# Patient Record
Sex: FEMALE | Employment: FULL TIME | URBAN - METROPOLITAN AREA
[De-identification: names, ages, dates, MRNs, and addresses within clinical notes are randomized per-mention and may not be internally consistent; named-entity substitution may affect disease eponyms.]

---

## 2018-12-20 ENCOUNTER — HOSPITAL ENCOUNTER (OUTPATIENT)
Age: 33
Setting detail: SPECIMEN
Discharge: HOME | End: 2018-12-20
Payer: COMMERCIAL

## 2018-12-20 LAB
DIRECT EXAM: NORMAL
Lab: NORMAL
SPECIMEN DESCRIPTION: NORMAL
STATUS: NORMAL

## 2018-12-24 LAB
CYTOLOGY REPORT: NORMAL
HPV SAMPLE: NORMAL
HPV SOURCE: NORMAL
HPV, GENOTYPE 16: NOT DETECTED
HPV, GENOTYPE 18: NOT DETECTED
HPV, HIGH RISK OTHER: NOT DETECTED
HPV, INTERPRETATION: NORMAL

## 2019-06-25 ENCOUNTER — OFFICE VISIT (OUTPATIENT)
Dept: PODIATRY | Age: 34
End: 2019-06-25

## 2019-06-25 VITALS
SYSTOLIC BLOOD PRESSURE: 133 MMHG | WEIGHT: 293 LBS | BODY MASS INDEX: 44.41 KG/M2 | HEIGHT: 68 IN | HEART RATE: 88 BPM | DIASTOLIC BLOOD PRESSURE: 78 MMHG

## 2019-06-25 DIAGNOSIS — Q66.30 PES VARUS: ICD-10-CM

## 2019-06-25 DIAGNOSIS — M79.672 PAIN, FOOT, CHRONIC, LEFT: ICD-10-CM

## 2019-06-25 DIAGNOSIS — M67.88 LEFT PERONEAL TENDINOSIS: Primary | ICD-10-CM

## 2019-06-25 DIAGNOSIS — G89.29 PAIN, FOOT, CHRONIC, LEFT: ICD-10-CM

## 2019-06-25 PROCEDURE — 99203 OFFICE O/P NEW LOW 30 MIN: CPT | Performed by: PODIATRIST

## 2019-06-25 RX ORDER — ATORVASTATIN CALCIUM 20 MG/1
20 TABLET, FILM COATED ORAL DAILY
Refills: 3 | COMMUNITY
Start: 2019-05-16

## 2019-06-25 RX ORDER — SITAGLIPTIN 100 MG/1
TABLET, FILM COATED ORAL
Refills: 3 | COMMUNITY
Start: 2019-03-29 | End: 2021-04-06

## 2019-06-25 RX ORDER — METFORMIN HYDROCHLORIDE 750 MG/1
750 TABLET, EXTENDED RELEASE ORAL 2 TIMES DAILY
COMMUNITY

## 2019-06-25 RX ORDER — FAMOTIDINE 40 MG/1
40 TABLET, FILM COATED ORAL 2 TIMES DAILY
Refills: 2 | COMMUNITY
Start: 2019-05-30

## 2019-06-25 RX ORDER — GLIMEPIRIDE 4 MG/1
4 TABLET ORAL 2 TIMES DAILY
COMMUNITY
Start: 2018-03-23

## 2019-06-25 RX ORDER — INSULIN DETEMIR 100 [IU]/ML
40 INJECTION, SOLUTION SUBCUTANEOUS NIGHTLY
Refills: 2 | COMMUNITY
Start: 2019-05-02

## 2019-06-25 RX ORDER — FERROUS SULFATE 325(65) MG
325 TABLET ORAL
Refills: 3 | COMMUNITY
Start: 2019-05-16

## 2019-06-25 RX ORDER — METOPROLOL SUCCINATE 100 MG/1
100 TABLET, EXTENDED RELEASE ORAL DAILY
COMMUNITY
Start: 2017-02-13

## 2019-06-25 RX ORDER — LOSARTAN POTASSIUM 25 MG/1
25 TABLET ORAL DAILY
Refills: 3 | COMMUNITY
Start: 2019-06-20

## 2019-06-25 RX ORDER — LEVOTHYROXINE SODIUM 0.05 MG/1
50 TABLET ORAL DAILY
Refills: 3 | COMMUNITY
Start: 2019-05-30

## 2019-06-25 SDOH — HEALTH STABILITY: MENTAL HEALTH: HOW OFTEN DO YOU HAVE A DRINK CONTAINING ALCOHOL?: NEVER

## 2019-06-25 NOTE — PROGRESS NOTES
Subjective:  Portia Sheppard is a 36 y.o. female who presents to the office today complaining of pain on the Left foot. Symptoms began 4 year(s) ago. History of injury: yes age 10 twisting injury and fx. More recently 3 years ago had  Multiple procedures performed on tendons L ankle. Pt unsure of exact procedures. That sx gave no relief. Patient relates pain is Present. Pain is rated 7 out of 10 and is described as waxing and waning, moderate. Treatments prior to today's visit include: none recent. Currently denies F/C/N/V. Allergies   Allergen Reactions    Erythromycin Base Rash    Gabapentin Rash       Past Medical History:   Diagnosis Date    Hyperlipemia     Hypertension     Hypothyroid     Type 2 diabetes mellitus ([facility] [address]        Prior to Admission medications    Medication Sig Start Date End Date Taking?  Authorizing Provider   glimepiride (AMARYL) 4 MG tablet Take 4 mg by mouth 3/23/18  Yes Historical Provider, MD   metFORMIN (GLUCOPHAGE-XR) 750 MG extended release tablet Take 750 mg by mouth   Yes Historical Provider, MD   LEVEMIR FLEXTOUCH 100 UNIT/ML injection pen  5/2/19  Yes Historical Provider, MD   metoprolol succinate (TOPROL XL) 100 MG extended release tablet Take 100 mg by mouth 2/13/17  Yes Historical Provider, MD   atorvastatin (LIPITOR) 20 MG tablet  5/16/19  Yes Historical Provider, MD   famotidine (PEPCID) 40 MG tablet  5/30/19  Yes Historical Provider, MD   sertraline (ZOLOFT) 50 MG tablet  5/30/19  Yes Historical Provider, MD   ferrous sulfate 325 (65 Fe) MG tablet  5/16/19  Yes Historical Provider, MD   levothyroxine (SYNTHROID) 50 MCG tablet  5/30/19  Yes Historical Provider, MD   losartan (COZAAR) 25 MG tablet  6/20/19  Yes Historical Provider, MD   Meriam [place] 100 MG tablet  3/29/19  Yes Historical Provider, MD       Past Surgical History:   Procedure Laterality Date    [facility]    ANKLE FRACTURE SURGERY         Family History   Problem Relation Age of Onset    Diabetes Mother     Cancer Mother     Hypertension Mother     Hyperthyroidism Mother     Heart Defect Mother     Cancer Father     Diabetes Father        Social History     Tobacco Use    Smoking status: Current Every Day Smoker     Packs/day: 0.50     Types: Cigarettes    Smokeless tobacco: Never Used   Substance Use Topics    Alcohol use: Never     Frequency: Never       Review of Systems: All 12 systems reviewed and pertinent positives noted above. Lower Extremity Physical Examination:     Vitals:   Vitals:    06/25/19 1424   BP: 133/78   Pulse: 88     General: AAO x 3 in NAD. Vascular: DP and PT pulses palpable 2/4, bilateral.  CFT <3 seconds, bilateral.  Hair growth present to the level of the digits, bilateral.  Edema absent, bilateral.  Varicosities absent, bilateral. Erythema absent, bilateral. Distal Rubor absent bilateral.  Temperature within normal limits bilateral. Hyperpigmentation absent bilateral. No atrophic skin. Neurological: Sensation intact to light touch to level of digits, bilateral.  Protective sensation intact 10/10 sites via 5.07/10g [place] Monofilament, bilateral.  negative Tinel's, bilateral.  negative Valleix sign, bilateral.  Vibratory intact bilateral.  Reflexes Decreased bilateral.  Paresthesias negative. Dysthesias negative. Sharp/dull intact bilateral.    Musculoskeletal: Muscle strength 5/5, Bilateral.  Pain with strength testing is absent. Pain present upon palpation of peroneal tendons from behind lateral malleolus down towards inserition L. , Left.  within normal limits medial longitudinal arch, Bilateral.  Ankle ROM decreased,Bilateral.  1st MPJ ROM within normal limits, Bilateral.  Dorsally contracted digits absent digits none, Bilateral. Other foot deformities excessive inversion with ROM of STJ and mild foot varus b/l.  .    Integument: Warm, dry, supple, bilateral.  Open lesion absent, bilateral.  Interdigital maceration absent to web spaces bilateral.  Nails within normal limits. Fissures absent, bilateral. Hyperkeratotic tissue is absent. Asessment: Patient is a 36 y.o. female with:    Diagnosis Orders   1. Left peroneal tendinosis     2. Pes varus     3. Pain, foot, chronic, left         Plan: Patient examined and evaluated. Current condition and treatment options discussed in detail. Pt was given the option of pre fabricated insoles. Pt was advised on appropriate use and how they can help their condition. Pt should use these in shoe gear 100% of the time WB. Pt will continue anti inflammatory she already has. Did not wish to take anything else since it never seemed to help much before. Will try to get old records from [place] [id number] from previous testing and procedure from 3 years back. Further recs once old testing and op report are seen. Pt will need AFO to try and be more supprotive but she does not currently have insurance so did not wish to look into that at this time. Contact office with any questions/problems/concerns. RTC in 3week(s).

## 2020-01-03 ENCOUNTER — HOSPITAL ENCOUNTER (OUTPATIENT)
Age: 35
Setting detail: SPECIMEN
Discharge: HOME | End: 2020-01-03
Payer: COMMERCIAL

## 2020-01-03 LAB
ALBUMIN SERPL-MCNC: 3.6 G/DL (ref 3.5–5.2)
ALBUMIN/GLOBULIN RATIO: 0.9 (ref 1–2.5)
ALP BLD-CCNC: 119 U/L (ref 35–104)
ALT SERPL-CCNC: 13 U/L (ref 5–33)
ANION GAP SERPL CALCULATED.3IONS-SCNC: 14 MMOL/L (ref 9–17)
AST SERPL-CCNC: 14 U/L
BILIRUB SERPL-MCNC: 0.21 MG/DL (ref 0.3–1.2)
BUN BLDV-MCNC: 9 MG/DL (ref 6–20)
BUN/CREAT BLD: ABNORMAL (ref 9–20)
CALCIUM SERPL-MCNC: 8.9 MG/DL (ref 8.6–10.4)
CHLORIDE BLD-SCNC: 105 MMOL/L (ref 98–107)
CHOLESTEROL/HDL RATIO: 3.4
CHOLESTEROL: 101 MG/DL
CO2: 22 MMOL/L (ref 20–31)
CREAT SERPL-MCNC: 0.63 MG/DL (ref 0.5–0.9)
GFR AFRICAN AMERICAN: >60 ML/MIN
GFR NON-AFRICAN AMERICAN: >60 ML/MIN
GFR SERPL CREATININE-BSD FRML MDRD: ABNORMAL ML/MIN/{1.73_M2}
GFR SERPL CREATININE-BSD FRML MDRD: ABNORMAL ML/MIN/{1.73_M2}
GLUCOSE BLD-MCNC: 259 MG/DL (ref 70–99)
HCT VFR BLD CALC: 47 % (ref 36.3–47.1)
HDLC SERPL-MCNC: 30 MG/DL
HEMOGLOBIN: 14.4 G/DL (ref 11.9–15.1)
LDL CHOLESTEROL: 50 MG/DL (ref 0–130)
MCH RBC QN AUTO: 25.8 PG (ref 25.2–33.5)
MCHC RBC AUTO-ENTMCNC: 30.6 G/DL (ref 28.4–34.8)
MCV RBC AUTO: 84.2 FL (ref 82.6–102.9)
NRBC AUTOMATED: 0 PER 100 WBC
PDW BLD-RTO: 14.6 % (ref 11.8–14.4)
PLATELET # BLD: 411 K/UL (ref 138–453)
PMV BLD AUTO: 9.7 FL (ref 8.1–13.5)
POTASSIUM SERPL-SCNC: 4.7 MMOL/L (ref 3.7–5.3)
RBC # BLD: 5.58 M/UL (ref 3.95–5.11)
SODIUM BLD-SCNC: 141 MMOL/L (ref 135–144)
THYROXINE, FREE: 1.07 NG/DL (ref 0.93–1.7)
TOTAL PROTEIN: 7.4 G/DL (ref 6.4–8.3)
TRIGL SERPL-MCNC: 105 MG/DL
TSH SERPL DL<=0.05 MIU/L-ACNC: 3.25 MIU/L (ref 0.3–5)
VLDLC SERPL CALC-MCNC: ABNORMAL MG/DL (ref 1–30)
WBC # BLD: 14.7 K/UL (ref 3.5–11.3)

## 2020-12-29 ENCOUNTER — HOSPITAL ENCOUNTER (OUTPATIENT)
Age: 35
Setting detail: SPECIMEN
Discharge: HOME | End: 2020-12-29
Payer: COMMERCIAL

## 2020-12-29 LAB
ALBUMIN SERPL-MCNC: 3.8 G/DL (ref 3.5–5.2)
ALBUMIN/GLOBULIN RATIO: 1 (ref 1–2.5)
ALP BLD-CCNC: 144 U/L (ref 35–104)
ALT SERPL-CCNC: 15 U/L (ref 5–33)
ANION GAP SERPL CALCULATED.3IONS-SCNC: 15 MMOL/L (ref 9–17)
AST SERPL-CCNC: 16 U/L
BILIRUB SERPL-MCNC: 0.21 MG/DL (ref 0.3–1.2)
BUN BLDV-MCNC: 12 MG/DL (ref 6–20)
BUN/CREAT BLD: ABNORMAL (ref 9–20)
CALCIUM SERPL-MCNC: 9.6 MG/DL (ref 8.6–10.4)
CHLORIDE BLD-SCNC: 103 MMOL/L (ref 98–107)
CHOLESTEROL/HDL RATIO: 3
CHOLESTEROL: 115 MG/DL
CO2: 24 MMOL/L (ref 20–31)
CREAT SERPL-MCNC: 0.7 MG/DL (ref 0.5–0.9)
GFR AFRICAN AMERICAN: >60 ML/MIN
GFR NON-AFRICAN AMERICAN: >60 ML/MIN
GFR SERPL CREATININE-BSD FRML MDRD: ABNORMAL ML/MIN/{1.73_M2}
GFR SERPL CREATININE-BSD FRML MDRD: ABNORMAL ML/MIN/{1.73_M2}
GLUCOSE BLD-MCNC: 168 MG/DL (ref 70–99)
HCT VFR BLD CALC: 49.3 % (ref 36.3–47.1)
HDLC SERPL-MCNC: 38 MG/DL
HEMOGLOBIN: 15.3 G/DL (ref 11.9–15.1)
LDL CHOLESTEROL: 60 MG/DL (ref 0–130)
MCH RBC QN AUTO: 26.5 PG (ref 25.2–33.5)
MCHC RBC AUTO-ENTMCNC: 31 G/DL (ref 28.4–34.8)
MCV RBC AUTO: 85.3 FL (ref 82.6–102.9)
NRBC AUTOMATED: 0 PER 100 WBC
PDW BLD-RTO: 14.9 % (ref 11.8–14.4)
PLATELET # BLD: 436 K/UL (ref 138–453)
PMV BLD AUTO: 9.9 FL (ref 8.1–13.5)
POTASSIUM SERPL-SCNC: 4.8 MMOL/L (ref 3.7–5.3)
RBC # BLD: 5.78 M/UL (ref 3.95–5.11)
SODIUM BLD-SCNC: 142 MMOL/L (ref 135–144)
THYROXINE, FREE: 1.11 NG/DL (ref 0.93–1.7)
TOTAL PROTEIN: 7.8 G/DL (ref 6.4–8.3)
TRIGL SERPL-MCNC: 83 MG/DL
TSH SERPL DL<=0.05 MIU/L-ACNC: 2.77 MIU/L (ref 0.3–5)
VITAMIN D 25-HYDROXY: 45.2 NG/ML (ref 30–100)
VLDLC SERPL CALC-MCNC: ABNORMAL MG/DL (ref 1–30)
WBC # BLD: 15.2 K/UL (ref 3.5–11.3)

## 2021-04-06 ENCOUNTER — OFFICE VISIT (OUTPATIENT)
Dept: PODIATRY | Age: 36
End: 2021-04-06
Payer: COMMERCIAL

## 2021-04-06 VITALS
HEART RATE: 68 BPM | SYSTOLIC BLOOD PRESSURE: 138 MMHG | DIASTOLIC BLOOD PRESSURE: 80 MMHG | HEIGHT: 68 IN | BODY MASS INDEX: 44.41 KG/M2 | WEIGHT: 293 LBS

## 2021-04-06 DIAGNOSIS — M76.72 PERONEAL TENDINITIS OF LEFT LOWER EXTREMITY: Primary | ICD-10-CM

## 2021-04-06 DIAGNOSIS — M21.172 ACQUIRED HEEL VARUS OF LEFT FOOT: ICD-10-CM

## 2021-04-06 DIAGNOSIS — M79.672 LEFT FOOT PAIN: ICD-10-CM

## 2021-04-06 PROCEDURE — 99214 OFFICE O/P EST MOD 30 MIN: CPT | Performed by: PODIATRIST

## 2021-04-06 RX ORDER — OMEGA-3 FATTY ACIDS/FISH OIL 300-1000MG
2 CAPSULE ORAL PRN
Status: ON HOLD | COMMUNITY
End: 2021-08-05 | Stop reason: HOSPADM

## 2021-04-06 RX ORDER — FUROSEMIDE 40 MG/1
40 TABLET ORAL PRN
COMMUNITY
Start: 2021-03-30

## 2021-04-06 RX ORDER — PREGABALIN 50 MG/1
CAPSULE ORAL
COMMUNITY
Start: 2021-03-30 | End: 2021-07-21

## 2021-04-06 RX ORDER — DULAGLUTIDE 1.5 MG/.5ML
1.5 INJECTION, SOLUTION SUBCUTANEOUS WEEKLY
COMMUNITY
Start: 2021-03-29

## 2021-04-06 NOTE — PROGRESS NOTES
Subjective:  Kate Romero is a 30 y.o. female who presents to the office today complaining of pain on the Left foot to ankle  Symptoms persisting over time. Patient thinks initial injury is approximately 9 years ago. Patient surgery about 5 years ago. Patient from a young age has multiple injuries cyst left foot especially. Multiple twisting injuries. Patient again states that previous procedures around 5 years ago gave no benefit to her symptoms whatsoever. Patient relates pain is Present. Pain is rated 8 out of 10 and is described as waxing and waning, severe at times . Pain worse the more she is on her feet. Currently denies F/C/N/V. Allergies   Allergen Reactions    Erythromycin Base Rash    Gabapentin Rash       Past Medical History:   Diagnosis Date    Hyperlipemia     Hypertension     Hypothyroid     Type 2 diabetes mellitus ([facility] [address]        Prior to Admission medications    Medication Sig Start Date End Date Taking?  Authorizing Provider   TRULICITY 1.5 SA/6.0UD SOPN  3/29/21  Yes Historical Provider, MD   furosemide (LASIX) 40 MG tablet  3/30/21  Yes Historical Provider, MD   pregabalin (LYRICA) 50 MG capsule  3/30/21  Yes Historical Provider, MD   ibuprofen (ADVIL;MOTRIN) 200 MG CAPS Take 2 tablets by mouth as needed   Yes Historical Provider, MD   diclofenac (VOLTAREN) 50 MG EC tablet Take 1 tablet by mouth 3 times daily (with meals) 4/6/21  Yes Skinny Upton DPM   Ankle Foot Arthosis MISC by Does not apply route Use lateral flare to correct varus L side  Peroneal tendinitis of left lower extremity  (primary encounter diagnosis)    Left foot pain    Acquired heel varus of left foot 4/6/21  Yes Marcelene Siemens Rohdy, DPM   glimepiride (AMARYL) 4 MG tablet Take 4 mg by mouth 3/23/18  Yes Historical Provider, MD   metFORMIN (GLUCOPHAGE-XR) 750 MG extended release tablet Take 750 mg by mouth   Yes Historical Provider, MD   LEVEMIR FLEXTOUCH 100 UNIT/ML injection pen  5/2/19  Yes Historical Provider, MD metoprolol succinate (TOPROL XL) 100 MG extended release tablet Take 100 mg by mouth 2/13/17  Yes Historical Provider, MD   atorvastatin (LIPITOR) 20 MG tablet  5/16/19  Yes Historical Provider, MD   famotidine (PEPCID) 40 MG tablet  5/30/19  Yes Historical Provider, MD   sertraline (ZOLOFT) 50 MG tablet  5/30/19  Yes Historical Provider, MD   ferrous sulfate 325 (65 Fe) MG tablet  5/16/19  Yes Historical Provider, MD   levothyroxine (SYNTHROID) 50 MCG tablet  5/30/19  Yes Historical Provider, MD   losartan (COZAAR) 25 MG tablet  6/20/19  Yes Historical Provider, MD       Past Surgical History:   Procedure Laterality Date    [facility]    ANKLE FRACTURE SURGERY         Family History   Problem Relation Age of Onset    Diabetes Mother     Cancer Mother     Hypertension Mother     Hyperthyroidism Mother     Heart Defect Mother     Cancer Father     Diabetes Father        Social History     Tobacco Use    Smoking status: Current Every Day Smoker     Packs/day: 0.50     Types: Cigarettes    Smokeless tobacco: Never Used   Substance Use Topics    Alcohol use: Never     Frequency: Never       Review of Systems: All 12 systems reviewed and pertinent positives noted above. Lower Extremity Physical Examination:     Vitals:   Vitals:    04/06/21 1017   BP: 138/80   Pulse: 68     General: AAO x 3 in NAD. Vascular: DP and PT pulses palpable 2/4, bilateral.  CFT <3 seconds, bilateral.  Hair growth present to the level of the digits, bilateral.  Edema absent, bilateral.  Varicosities absent, bilateral. Erythema absent, bilateral. Distal Rubor absent bilateral.  Temperature within normal limits bilateral. Hyperpigmentation absent bilateral. No atrophic skin.     Neurological: Sensation intact to light touch to level of digits, bilateral.  Protective sensation intact 10/10 sites via 5.07/10g [place]riel Monofilament, bilateral.  negative Tinel's, bilateral.  negative permanent. There is a need to control the foot/ankle in more than one plane. Pt has either a neurologic, circulatory, or orthopedic deformity that required custom molding. This pt is ambulatory. Previous device is ill fitting and needs functional improvement. Cardiopulmonary, neurological, and musculoskeletal systems will support ambulation. Patient possesses the balance and coordination necessary to utilize a prosthesis. Orders Placed This Encounter   Medications    diclofenac (VOLTAREN) 50 MG EC tablet     Sig: Take 1 tablet by mouth 3 times daily (with meals)     Dispense:  60 tablet     Refill:  1    Ankle Foot Arthosis MISC     Sig: by Does not apply route Use lateral flare to correct varus L side  Peroneal tendinitis of left lower extremity  (primary encounter diagnosis)    Left foot pain    Acquired heel varus of left foot     Dispense:  1 each     Refill:  0     Patient moderate level medical decision making this visit. Patient chronic stable condition along with acute complicated condition. Patient had multiple test and notes reviewed from previous physician. MRI reviewed with the patient in detail. Old x-ray reports reviewed with the patient in detail. Patient referred to reconstructive surgeon for possible correction condition due to chronic pain. Note given too be off work for next week. Contact office with any questions/problems/concerns.   RTC in PRN

## 2021-04-14 DIAGNOSIS — M79.672 FOOT PAIN, LEFT: Primary | ICD-10-CM

## 2021-04-19 ENCOUNTER — OFFICE VISIT (OUTPATIENT)
Dept: ORTHOPEDIC SURGERY | Age: 36
End: 2021-04-19
Payer: COMMERCIAL

## 2021-04-19 VITALS — HEIGHT: 68 IN | RESPIRATION RATE: 12 BRPM | WEIGHT: 293 LBS | BODY MASS INDEX: 44.41 KG/M2

## 2021-04-19 DIAGNOSIS — M21.6X9 CAVUS DEFORMITY OF FOOT, ACQUIRED: ICD-10-CM

## 2021-04-19 DIAGNOSIS — M21.869 GASTROCNEMIUS EQUINUS, UNSPECIFIED LATERALITY: ICD-10-CM

## 2021-04-19 DIAGNOSIS — M25.372 INSTABILITY OF LEFT ANKLE JOINT: ICD-10-CM

## 2021-04-19 DIAGNOSIS — M76.72 PERONEAL TENDINITIS OF BOTH LOWER LEGS: Primary | ICD-10-CM

## 2021-04-19 DIAGNOSIS — M76.71 PERONEAL TENDINITIS OF BOTH LOWER LEGS: Primary | ICD-10-CM

## 2021-04-19 PROCEDURE — 99204 OFFICE O/P NEW MOD 45 MIN: CPT | Performed by: ORTHOPAEDIC SURGERY

## 2021-04-19 NOTE — LETTER
Dr. Tin Agustin  [address] [place] [address]  [phone]        4/19/2021     Patient: Cindy Serrano  YOB: 1985    Dear Ace Perrin DPM,    I had the pleasure of seeing one of your patients, Cindy Serrano, recently in the office. Below are the relevant portions of my assessment and plan of care. ASSESSMENT AND PLAN:  She has bilateral (left greater than right) peroneal tendinopathy, with underlying mild cavus radiographically and equinus. She also reports a history of left ankle instability (negative physical exam findings). Notably, she has a complex past medical history. She has a history of heart disease, insulin-dependent diabetes with neuropathy, history of CRPS type II, and tobacco use (reports she smokes 1/2 pack cigarettes per day). We had a discussion today about the likely diagnosis and its natural history, physical exam and imaging findings, as well as various treatment options in detail. Surgically, we discussed possible peroneal tendon repair versus debridement/tenolysis and/or tendon transfer, depending on symptom control with conservative management. We discussed the expected postoperative course, including the relevant weightbearing restrictions and immobilization. At this point, the patient does wish to attempt to treat her problem conservatively if possible. Given her history of CRPS type II, and her insulin-dependent diabetes with neuropathy as well as her tobacco use, she does have significant risk factors for postoperative complications. Orders/referrals were placed as below at today's visit. The patient was cautioned to avoid pain provoking activity. The patient will use a cam boot when ambulatory for pain control, which was provided today along with an Ace wrap.   We discussed the risks of blood clots, and she will remove the cam boot often for range of motion, particularly when she is not on her feet. I referred the patient to physical therapy for my peroneal tendinopathy protocol, as well as my ankle sprain protocol. I provided a prescription for Voltaren (4g TOPL q QID PRN pain). I recommend this over the use of oral NSAIDs because of her diabetes. The patient was also ordered compression socks. The patient was also provided information on how to obtain an over-the-counter cavus style orthotic. All questions were answered and the above plan was agreed upon. The patient will return to clinic in 3 months without x-rays. At her next visit, depending on how she is doing, we may consider an MRI of her left ankle/foot. Thank you for allowing me to participate in the care of this patient. I look forward to serving you and your patients again in the future. Please don't hesitate to contact me at my mobile number .         Hernan Page MD  Orthopedic Surgery

## 2021-04-19 NOTE — LETTER
Dr. Darren Concepcion [address] [address].  [address] [facility] [address]  [phone]        4/19/2021     Patient: Ingrid Monsalve  YOB: 1985    Dear CLARK Sandhu CNP,    I had the pleasure of seeing one of your patients, Ingrid Monsalve recently in the office. Below are the relevant portions of my assessment and plan of care. ASSESSMENT AND PLAN:  She has bilateral (left greater than right) peroneal tendinopathy, with underlying mild cavus radiographically and equinus. She also reports a history of left ankle instability (negative physical exam findings). Notably, she has a complex past medical history. She has a history of heart disease, insulin-dependent diabetes with neuropathy, history of CRPS type II, and tobacco use (reports she smokes 1/2 pack cigarettes per day). We had a discussion today about the likely diagnosis and its natural history, physical exam and imaging findings, as well as various treatment options in detail. Surgically, we discussed possible peroneal tendon repair versus debridement/tenolysis and/or tendon transfer, depending on symptom control with conservative management. We discussed the expected postoperative course, including the relevant weightbearing restrictions and immobilization. At this point, the patient does wish to attempt to treat her problem conservatively if possible. Given her history of CRPS type II, and her insulin-dependent diabetes with neuropathy as well as her tobacco use, she does have significant risk factors for postoperative complications. Orders/referrals were placed as below at today's visit. The patient was cautioned to avoid pain provoking activity. The patient will use a cam boot when ambulatory for pain control, which was provided today along with an Ace wrap.   We discussed the risks of blood clots, and she will remove the cam boot often for range of motion, particularly when she is not on her feet. I referred the patient to physical therapy for my peroneal tendinopathy protocol, as well as my ankle sprain protocol. I provided a prescription for Voltaren (4g TOPL q QID PRN pain). I recommend this over the use of oral NSAIDs because of her diabetes. The patient was also ordered compression socks. The patient was also provided information on how to obtain an over-the-counter cavus style orthotic. All questions were answered and the above plan was agreed upon. The patient will return to clinic in 3 months without x-rays. At her next visit, depending on how she is doing, we may consider an MRI of her left ankle/foot. I look forward to serving you and your patients again in the future. Please don't hesitate to contact me at my mobile number .         Mami Marte MD  Orthopedic Surgery

## 2021-04-19 NOTE — LETTER
[address] [address]  Phone: [phone]  Fax: [phone]    Domenica Najera MD        April 19, 2021     Patient: Cecelia Mcdowell   YOB: 1985   Date of Visit: 4/19/2021       To Whom it May Concern: Carito Youngblood was seen in my clinic on 4/19/2021. She may return to work on 4/20/21. If you have any questions or concerns, please don't hesitate to call.     Sincerely,     The office of    Domenica Najera MD

## 2021-04-27 ENCOUNTER — HOSPITAL ENCOUNTER (OUTPATIENT)
Dept: PHYSICAL THERAPY | Age: 36
Setting detail: THERAPIES SERIES
Discharge: HOME | End: 2021-04-27
Payer: COMMERCIAL

## 2021-04-27 PROCEDURE — 97110 THERAPEUTIC EXERCISES: CPT | Performed by: PHYSICAL THERAPIST

## 2021-04-27 PROCEDURE — 97162 PT EVAL MOD COMPLEX 30 MIN: CPT | Performed by: PHYSICAL THERAPIST

## 2021-04-27 ASSESSMENT — PAIN DESCRIPTION - DESCRIPTORS: DESCRIPTORS: BURNING;THROBBING

## 2021-04-27 ASSESSMENT — PAIN DESCRIPTION - PAIN TYPE: TYPE: CHRONIC PAIN

## 2021-04-27 ASSESSMENT — PAIN DESCRIPTION - FREQUENCY: FREQUENCY: CONTINUOUS

## 2021-04-27 ASSESSMENT — PAIN DESCRIPTION - PROGRESSION: CLINICAL_PROGRESSION: GRADUALLY WORSENING

## 2021-04-27 ASSESSMENT — PAIN DESCRIPTION - ONSET: ONSET: ON-GOING

## 2021-04-27 NOTE — PROGRESS NOTES
Physical Therapy  Initial Assessment  Date: 2021  Patient Name: Bri Oseguera  MRN: [id number]  : 1985     Treatment Diagnosis: bilat foot/ankle pain    Restrictions       Subjective   General  Chart Reviewed: Yes  Patient assessed for rehabilitation services?: Yes  Response To Previous Treatment: Not applicable  Family / Caregiver Present: No  Referring Practitioner: Dayami Woods MD  Referral Date : 21  Diagnosis: peroneal tendinitis of bilat LE's; Cavus deformity of foot; gastrocnemius equinus  Follows Commands: Within Functional Limits  PT Visit Information  Onset Date: 21  PT Insurance Information: BCBS  Subjective  Subjective: \"My feet have been bothering me for awhile, especially the left one. I had surgery on it 5 years ago (ligament repair, scar tissue removal) and it's getting worse again. Now the right one is starting to have pain on the outer part when I stand too long. I get a burning feeling on the outer part of the left foot and heel. Trying to rise onto my tiptoes increases the pain. Occasionally the legs will swell if I sit for too long without propping up my feet. Sitting down is the only thing that makes the feet and ankle feel better. \"  Pain Screening  Patient Currently in Pain: Yes  Pain Assessment  Pain Assessment: 0-10  Pain Level: 9  Patient's Stated Pain Goal: No pain  Pain Type: Chronic pain  Pain Location: Ankle; Foot  Pain Orientation: Left;Right;Distal;Lower;Mid;Outer  Pain Radiating Towards: radiates from lateral foot/ankle up through lateral calf/gastroc  Pain Descriptors: Burning; Throbbing  Pain Frequency: Continuous  Pain Onset: On-going  Clinical Progression: Gradually worsening  Functional Pain Assessment: Prevents or interferes with many active not passive activities  Non-Pharmaceutical Pain Intervention(s): Rest;Repositioned;Elevation;Cold applied  Vital Signs  Patient Currently in Pain: Yes    Orientation  Orientation  Overall Orientation Status: Within Normal Limits    Social/Functional History  Social/Functional History  Lives With: Family  Type of Home: House  Home Layout: One level  Home Access: Stairs to enter without rails  Entrance Stairs - Number of Steps: 2  Receives Help From: Family  ADL Assistance: Independent  Homemaking Assistance: Independent  Homemaking Responsibilities: Yes  Meal Prep Responsibility: Primary  Laundry Responsibility: Primary  Cleaning Responsibility: Primary  Shopping Responsibility: Primary  Ambulation Assistance: Independent  Transfer Assistance: Independent  Active : Yes  Mode of Transportation: Car;SUV  Occupation: Full time employment  Type of occupation: - 8 hour shifts, standing on concrete  Leisure & Hobbies: walking, reading    Objective     Observation/Palpation  Posture: Fair  Palpation: increased tone throughout gastroc bilaterally  Observation: pes cavus of bilat feet, supinated calcanei bilaterally    PROM RLE (degrees)  RLE PROM: WNL  AROM RLE (degrees)  RLE AROM: Exceptions  R Ankle Dorsiflexion 0-20: 7  R Ankle Plantar Flexion 0-45: 45  R Ankle Forefoot Inversion 0-40: 40  R Ankle Forefoot Eversion 0-20: 8  PROM LLE (degrees)  LLE PROM: WFL  AROM LLE (degrees)  LLE AROM : Exceptions  L Ankle Dorsiflexion 0-20: 7  L Ankle Plantar Flexion 0-45: 45  L Ankle Forefoot Inversion 0-40: 40  L Ankle Forefoot Eversion 0-20: 5    Strength RLE  Strength RLE: Exception  R Ankle Dorsiflexion: 4-/5  R Ankle Eversion: 4-/5  Strength LLE  Strength LLE: Exception  L Ankle Dorsiflexion: 3+/5  L Ankle Eversion: 3+/5     Additional Measures  Special Tests: - Squeeze, - ankle anterior/posterior drawer     Ambulation  Ambulation?: Yes  Ambulation 1  Assistance: Independent  Comments: pes cavus bilaterally, wear pattern on soles of shoes reveals greatest pressure through lateral foot     Assessment   Conditions Requiring Skilled Therapeutic Intervention  Body structures, Functions, Activity limitations: Decreased ADL status; Decreased ROM; Decreased strength; Increased pain  Treatment Diagnosis: bilat foot/ankle pain  Prognosis: Good  Decision Making: Medium Complexity  REQUIRES PT FOLLOW UP: Yes  Activity Tolerance  Activity Tolerance: Patient Tolerated treatment well     Plan   Plan  Times per week: 2  Plan weeks: 6  Current Treatment Recommendations: Strengthening, ROM, Balance Training, Gait Training, Manual Therapy - Soft Tissue Mobilization, Neuromuscular Re-education, Home Exercise Program, Integrated Dry Needling, Modalities     Goals  Short term goals  Time Frame for Short term goals: 3 weeks  Short term goal 1: Initiate HEP  Short term goal 2: Decrease bilat foot/ankle pain to <6/10 for improved ease with ADL and ambulation  Short term goal 3: Increase bilat ankle AROM to [facility] for improved ease with ADL and gait  Long term goals  Time Frame for Long term goals : 6 weeks  Long term goal 1: Indep with HEP  Long term goal 2: Decrease bilat foot/ankle pain to <2/10 for improved ease with ADL and ambulation  Long term goal 3:  Increase bilat ankle strength to [facility] for improved ease with ADL and gait  Long term goal 4: Pt to stand/ambulate for >60 min without increased pain or need for rest break for improved ease with work duties  Long term goal 5: LEFS score <15% disabeld for return to previous level of function     Therapy Time   Individual Concurrent Group Co-treatment   Time In 0900         Time Out 0957         Minutes 57         Timed Code Treatment Minutes: Glenn Mckeon, PT, DPT

## 2021-04-27 NOTE — PLAN OF CARE
[facility]    [x] [place]  Phone: [phone]  Fax: [phone]      [] [place]  Phone: [phone]  Fax: [phone]        To: Referring Practitioner: Kranthi Wong MD      Patient: Cindy Serrano  : 1985   MRN: [id number]  Evaluation Date: 2021      Diagnosis Information:  · Diagnosis: peroneal tendinitis of bilat LE's; Cavus deformity of foot; gastrocnemius equinus   · Treatment Diagnosis: bilat foot/ankle pain     Physical Therapy Certification Form  Dear Dr. Sotomayor Clos  The following patient has been evaluated for physical therapy services and for therapy to continue, insurance requires monthly physician review of the treatment plan. Please review the attached evaluation and/or summary of the patient's plan of care, and verify that you agree therapy should continue by signing the attached document and sending it back to our office. Plan of Care/Treatment to date:  [x] Therapeutic Exercise    [x] Modalities:  [] Therapeutic Activity     [x] Ultrasound  [x] Electrical Stimulation  [x] Gait Training      [] Cervical Traction [] Lumbar Traction  [x] Neuromuscular Re-education    [] Cold/hotpack [] Iontophoresis   [x] Instruction in HEP     Other:  [x] Manual Therapy / IDN      []             [] Aquatic Therapy      []           ? Goals:  Short term goals  Time Frame for Short term goals: 3 weeks  Short term goal 1: Initiate HEP  Short term goal 2: Decrease bilat foot/ankle pain to <6/10 for improved ease with ADL and ambulation  Short term goal 3: Increase bilat ankle AROM to [facility] for improved ease with ADL and gait    Long term goals  Time Frame for Long term goals : 6 weeks  Long term goal 1: Indep with HEP  Long term goal 2: Decrease bilat foot/ankle pain to <2/10 for improved ease with ADL and ambulation  Long term goal 3:  Increase bilat ankle strength to [facility] for improved ease with ADL and gait  Long term goal 4: Pt to stand/ambulate for >60 min without increased pain or need for rest break for improved ease with work duties  Long term goal 5: LEFS score <15% disabeld for return to previous level of function    Frequency/Duration: 4/27/21 - 6/8/21  # Days per week: [] 1 day # Weeks: [] 1 week [] 5 weeks     [x] 2 days? [] 2 weeks [x] 6 weeks     [] 3 days   [] 3 weeks [] 7 weeks     [] 4 days   [] 4 weeks [] 8 weeks    Rehab Potential: [] Excellent [x] Good [] Fair  [] Poor     Electronically signed by:  Judson Farrar, PT, DPT    If you have any questions or concerns, please don't hesitate to call.   Thank you for your referral.      Physician Signature:________________________________Date:__________________  By signing above, therapists plan is approved by physician

## 2021-04-30 ENCOUNTER — HOSPITAL ENCOUNTER (OUTPATIENT)
Dept: PHYSICAL THERAPY | Age: 36
Setting detail: THERAPIES SERIES
Discharge: HOME | End: 2021-04-30
Payer: COMMERCIAL

## 2021-04-30 NOTE — PROGRESS NOTES
Physical Therapy    Outpatient Physical Therapy    [x] [place]  Phone: [phone]  Fax: [phone]      [] [place]  Phone: [phone]  Fax: [phone]    Physical Therapy  Cancellation/No-show Note  Patient Name:  Leander Gallego  :  1985   Date:  2021  Cancelled visits to date: 1  No-shows to date: 0    For today's appointment patient:  [x]  Cancelled  []  Rescheduled appointment  []  No-show     Reason given by patient:  []  Patient ill  []  Conflicting appointment  []  No transportation    []  Conflict with work  [x]  No reason given  []  Other:     Comments:      Electronically signed by: Anthony Strange PTA

## 2021-05-03 ENCOUNTER — HOSPITAL ENCOUNTER (OUTPATIENT)
Dept: PHYSICAL THERAPY | Age: 36
Setting detail: THERAPIES SERIES
Discharge: HOME | End: 2021-05-03
Payer: COMMERCIAL

## 2021-05-03 PROCEDURE — 97035 APP MDLTY 1+ULTRASOUND EA 15: CPT

## 2021-05-03 PROCEDURE — 97110 THERAPEUTIC EXERCISES: CPT

## 2021-05-03 NOTE — FLOWSHEET NOTE
Physical Therapy Daily Treatment Note    Date:  5/3/2021    Patient Name:  Lola Gar    :  1985  MRN: [id number]  Restrictions/Precautions:     Medical/Treatment Diagnosis Information:   · Diagnosis: peroneal tendinitis of bilat LE's; Cavus deformity of foot; gastrocnemius equinus  · Treatment Diagnosis: bilat foot/ankle pain  Insurance/Certification information:  PT Insurance Information: BCBS  Physician Information:  Referring Practitioner: Don Osorio MD  Plan of care signed (Y/N):  Y  Visit# / total visits:    Pain level: 10/10       Time In: 9:45 AM   Time Out: 10:38 AM    Progress Note: []  Yes  [x]  No  Next due by: Visit #12 Or by 21     Subjective: Patient reports the pain is pretty severe this morning. Patient reports her left is worse than right. Patient reports she has been doing her exercises but feels like it's not helping. Patient reports she usually wears her shoes with orthotics. Patient reports 8 hour work shift really hurt her by end of shift. Patient reports positive sleep disturbances. Patient reports the ice and heat having helped either    Objective: Performed there-ex as documented on flowsheet to improve strength, ROM, and stability for ease with daily tasks, ambulation and work duties. Patient given verbal cues to perform exercises properly. Initiated new exercises with good tolerance. Concluded treatment with ultrasound to left lateral ankle. Patient denied any pain in left ankle upon leaving clinic even in weightbearing.  Observation: minimal to moderate swelling left lateral      Tenderness left global ankle   Test measurements:   Ankle DF: 5 degrees left    Exercises:   Exercise/Equipment Resistance/Repetitions Other comments   Radha 5'    Standing HR/TR 15x    Gastroc Step Stretch 3x30\"    Slantboard 3x30\"         Ankle 4 way 20x each    Ankle circles 20x each    Ankle ABC A-Z 1x    Active Arches 3\" x 20    Toe Yoga 20x each    Seated HR/TR 20x Toe Curls, Ext 20x         ultrasound 3 Mhz, 1.0 wcm2     [x] Provided verbal/tactile cueing for activities related to strengthening, flexibility, endurance, ROM. (19584)  [] Provided verbal/tactile cueing for activities related to improving balance, coordination, kinesthetic sense, posture, motor skill, proprioception. (98888)    Therapeutic Activities:     [] Therapeutic activities, direct (one-on-one) patient contact (use of dynamic activities to improve functional performance). (07101)    Gait:   [] Provided training and instruction to the patient for ambulation re-education. (67564)    Self-Care/ADL's  [] Self-care/home management training and compensatory training, meal preparation, safety procedures, and instructions in use of assistive technology devices/adaptive equipment, direct one-on-one contact. (94453)    Home Exercise Program:     [x] Reviewed/Progressed HEP activities related to strengthening, flexibility, endurance, ROM. (68081)  [] Reviewed/Progressed HEP activities related to improving balance, coordination, kinesthetic sense, posture, motor skill, proprioception.  (61452)    Manual Treatments:    [] Provided manual therapy to mobilize soft tissue/joints for the purpose of modulating pain, promoting relaxation,  increasing ROM, reducing/eliminating soft tissue swelling/inflammation/restriction, improving soft tissue extensibility.  (54972)    Service Based Modalities:      Timed Code Treatment Minutes:   39' ther-ex       8' ultrasound    Total Treatment Minutes:   48'     Treatment/Activity Tolerance:  [x] Patient tolerated treatment well [] Patient limited by fatique  [] Patient limited by pain  [] Patient limited by other medical complications  [] Other:     Prognosis: [x] Good [] Fair  [] Poor    Patient Requires Follow-up: [x] Yes  [] No      Goals:  Short term goals  Time Frame for Short term goals: 3 weeks  Short term goal 1: Initiate HEP (initiated 4/27)  Short term goal 2: Decrease bilat foot/ankle pain to <6/10 for improved ease with ADL and ambulation  Short term goal 3: Increase bilat ankle AROM to [facility] for improved ease with ADL and gait    Long term goals  Time Frame for Long term goals : 6 weeks  Long term goal 1: Indep with HEP  Long term goal 2: Decrease bilat foot/ankle pain to <2/10 for improved ease with ADL and ambulation  Long term goal 3: Increase bilat ankle strength to [facility] for improved ease with ADL and gait  Long term goal 4: Pt to stand/ambulate for >60 min without increased pain or need for rest break for improved ease with work duties  Long term goal 5: LEFS score <15% disabeld for return to previous level of function    Plan:   [] Continue per plan of care [] Alter current plan (see comments)  [x] Plan of care initiated [] Hold pending MD visit [] Discharge    Plan for Next Session:  Progress as tolerated. Monitor tolerance to ultrasound.     Electronically signed by:  Michael Burt

## 2021-05-05 ENCOUNTER — HOSPITAL ENCOUNTER (OUTPATIENT)
Dept: PHYSICAL THERAPY | Age: 36
Setting detail: THERAPIES SERIES
Discharge: HOME | End: 2021-05-05
Payer: COMMERCIAL

## 2021-05-05 PROCEDURE — 97110 THERAPEUTIC EXERCISES: CPT

## 2021-05-05 NOTE — FLOWSHEET NOTE
Physical Therapy Daily Treatment Note    Date:  2021    Patient Name:  Lola Gar    :  1985  MRN: [id number]  Restrictions/Precautions:     Medical/Treatment Diagnosis Information:   · Diagnosis: peroneal tendinitis of bilat LE's; Cavus deformity of foot; gastrocnemius equinus  · Treatment Diagnosis: bilat foot/ankle pain  Insurance/Certification information:  PT Insurance Information: BCBS  Physician Information:  Referring Practitioner: Don Osorio MD  Plan of care signed (Y/N):  Y  Visit# / total visits:  3/12  Pain level: 10/10       Time In: 9:45 AM   Time Out: 10:33 AM    Progress Note: []  Yes  [x]  No  Next due by: Visit #12 Or by 21     Subjective: Patient continues to have increased pain levels with work duties, daily tasks, and ambulation. Patient reports the ultrasound felt good after last visit only until she began driving then the pain was right back. Objective: Performed ther-ex as documented on flowsheet to improve strength, ROM, and stability for ease with daily tasks, ambulation and work duties. Patient given verbal cues to perform exercises properly. Initiated new exercises with good tolerance. Concluded treatment with ultrasound to left lateral ankle. Patient denied any pain in left ankle upon leaving clinic even in weightbearing.  Observation: minimal swelling left lateral      Tenderness left global ankle   Test measurements:   Ankle DF: 5 degrees left    Exercises:   Exercise/Equipment Resistance/Repetitions Other comments   Radha 5'    Standing HR/TR 20x    Gastroc Step Stretch 3x30\"    Slantboard 3x30\"    FTEO, FAEC 2x30\" foam   sidesteps 2 laps    BAPS board 10x each Fwd/bwkd, circles, side/side - level 2   Ankle 4 way 20x each    Ankle circles 20x each    Ankle ABC A-Z 1x    Active Arches 3\" x 20    Toe Yoga 20x each    Seated HR/TR 20x    Toe Curls, Ext 20x         ultrasound 3 Mhz, 1.0 wcm2     [x] Provided verbal/tactile cueing for activities related to strengthening, flexibility, endurance, ROM. (06717)  [] Provided verbal/tactile cueing for activities related to improving balance, coordination, kinesthetic sense, posture, motor skill, proprioception. (76734)    Therapeutic Activities:     [] Therapeutic activities, direct (one-on-one) patient contact (use of dynamic activities to improve functional performance). (96332)    Gait:   [] Provided training and instruction to the patient for ambulation re-education. (83487)    Self-Care/ADL's  [] Self-care/home management training and compensatory training, meal preparation, safety procedures, and instructions in use of assistive technology devices/adaptive equipment, direct one-on-one contact. (88556)    Home Exercise Program:     [x] Reviewed/Progressed HEP activities related to strengthening, flexibility, endurance, ROM. (96910)  [] Reviewed/Progressed HEP activities related to improving balance, coordination, kinesthetic sense, posture, motor skill, proprioception.  (06072)    Manual Treatments:    [] Provided manual therapy to mobilize soft tissue/joints for the purpose of modulating pain, promoting relaxation,  increasing ROM, reducing/eliminating soft tissue swelling/inflammation/restriction, improving soft tissue extensibility. (36728)    Service Based Modalities:      Timed Code Treatment Minutes:   36' ther-ex       8' ultrasound (NC)    Total Treatment Minutes:   50'     Treatment/Activity Tolerance:  [x] Patient tolerated treatment well [] Patient limited by fatique  [] Patient limited by pain  [] Patient limited by other medical complications  [] Other:     Prognosis: [x] Good [] Fair  [] Poor    Patient Requires Follow-up: [x] Yes  [] No      Goals:  Short term goals  Time Frame for Short term goals: 3 weeks  Short term goal 1: Initiate HEP (initiated 4/27)  Short term goal 2: Decrease bilat foot/ankle pain to <6/10 for improved ease with ADL and ambulation  Short term goal 3:  Increase bilat ankle AROM to [facility] for improved ease with ADL and gait    Long term goals  Time Frame for Long term goals : 6 weeks  Long term goal 1: Indep with HEP  Long term goal 2: Decrease bilat foot/ankle pain to <2/10 for improved ease with ADL and ambulation  Long term goal 3: Increase bilat ankle strength to [facility] for improved ease with ADL and gait  Long term goal 4: Pt to stand/ambulate for >60 min without increased pain or need for rest break for improved ease with work duties  Long term goal 5: LEFS score <15% disabeld for return to previous level of function    Plan:   [] Continue per plan of care [] Alter current plan (see comments)  [x] Plan of care initiated [] Hold pending MD visit [] Discharge    Plan for Next Session:  Progress as tolerated. Monitor tolerance to ultrasound.     Electronically signed by:  Ashleigh Guy

## 2021-05-10 ENCOUNTER — OFFICE VISIT (OUTPATIENT)
Dept: ORTHOPEDIC SURGERY | Age: 36
End: 2021-05-10
Payer: COMMERCIAL

## 2021-05-10 VITALS — WEIGHT: 293 LBS | HEIGHT: 68 IN | RESPIRATION RATE: 12 BRPM | BODY MASS INDEX: 44.41 KG/M2

## 2021-05-10 DIAGNOSIS — M25.372 INSTABILITY OF LEFT ANKLE JOINT: ICD-10-CM

## 2021-05-10 DIAGNOSIS — M21.869 GASTROCNEMIUS EQUINUS, UNSPECIFIED LATERALITY: ICD-10-CM

## 2021-05-10 DIAGNOSIS — M21.6X9 CAVUS DEFORMITY OF FOOT, ACQUIRED: ICD-10-CM

## 2021-05-10 DIAGNOSIS — M76.71 PERONEAL TENDINITIS OF BOTH LOWER LEGS: Primary | ICD-10-CM

## 2021-05-10 DIAGNOSIS — M76.72 PERONEAL TENDINITIS OF BOTH LOWER LEGS: Primary | ICD-10-CM

## 2021-05-10 PROCEDURE — 99213 OFFICE O/P EST LOW 20 MIN: CPT | Performed by: ORTHOPAEDIC SURGERY

## 2021-05-10 NOTE — PROGRESS NOTES
[facility] [id number] [facility]  [address]  [address] [address]  Dept: [phone]    Ambulatory Orthopedic Consult      CHIEF COMPLAINT:    Chief Complaint   Patient presents with    Foot Pain       HISTORY OF PRESENT ILLNESS:      The patient is a 29 y.o. female who is being seen for evaluation of pain at the above location at the bilateral lateral hindfoot/ankle to the lateral midfoot, which began many years ago. The patient reports a progressive course. The patient has tried:      [x]  rest/activity modification          [x]  NSAIDs      []  opiates      [x]  orthotics        [x]  change in shoes   []  home exercises  [x]  physical therapy      []  CAM boot     [x]  brace:    []  injection:       [x]  surgery:      The patient is referred here today by Dr. Jerome Weems. She reports that she had surgery in February 2016 with Dr. Wayne Licona for her left foot \"tendons and ligaments\". She also reports a history of neuropathy and \"nerve damage\" in her left foot. INTERVAL HISTORY 5/10/2021:  She is seen again today in the office for follow up of a previous issue (as above). Since being seen last, the patient is doing worse. At today's visit, she is not using a brace or assistive device. History is obtained today from:   [x]  the patient     []  EMR     []  one family member/friend    []  multiple family members/friends    []  other:          REVIEW OF SYSTEMS:  Constitutional: Negative for fever. HENT: Negative for tinnitus. Eyes: Negative for pain. Respiratory: Negative for shortness of breath. Cardiovascular: Negative for chest pain. Gastrointestinal: Negative for abdominal pain. Genitourinary: Negative for dysuria. Skin: Negative for rash. Neurological: Negative for headaches. Hematological: Does not bruise/bleed easily.    Musculoskeletal: See HPI for pertinent positives     Past Medical History:    She  has a past medical history of Hyperlipemia, Hypertension, Hypothyroid, and Type 2 diabetes mellitus ([facility] [address] Past Surgical History:    She  has a past surgical history that includes Ankle fracture surgery and Ankle arthroscopy (Left, 02/2016). Current Medications:     Current Outpatient Medications:     TRULICITY 1.5 NS/7.6MC SOPN, , Disp: , Rfl:     furosemide (LASIX) 40 MG tablet, , Disp: , Rfl:     pregabalin (LYRICA) 50 MG capsule, , Disp: , Rfl:     ibuprofen (ADVIL;MOTRIN) 200 MG CAPS, Take 2 tablets by mouth as needed, Disp: , Rfl:     diclofenac (VOLTAREN) 50 MG EC tablet, Take 1 tablet by mouth 3 times daily (with meals), Disp: 60 tablet, Rfl: 1    Ankle Foot Arthosis MISC, by Does not apply route Use lateral flare to correct varus L side Peroneal tendinitis of left lower extremity  (primary encounter diagnosis)  Left foot pain  Acquired heel varus of left foot, Disp: 1 each, Rfl: 0    glimepiride (AMARYL) 4 MG tablet, Take 4 mg by mouth, Disp: , Rfl:     metFORMIN (GLUCOPHAGE-XR) 750 MG extended release tablet, Take 750 mg by mouth, Disp: , Rfl:     LEVEMIR FLEXTOUCH 100 UNIT/ML injection pen, , Disp: , Rfl: 2    metoprolol succinate (TOPROL XL) 100 MG extended release tablet, Take 100 mg by mouth, Disp: , Rfl:     atorvastatin (LIPITOR) 20 MG tablet, , Disp: , Rfl: 3    famotidine (PEPCID) 40 MG tablet, , Disp: , Rfl: 2    sertraline (ZOLOFT) 50 MG tablet, , Disp: , Rfl: 3    ferrous sulfate 325 (65 Fe) MG tablet, , Disp: , Rfl: 3    levothyroxine (SYNTHROID) 50 MCG tablet, , Disp: , Rfl: 3    losartan (COZAAR) 25 MG tablet, , Disp: , Rfl: 3     Allergies:    Erythromycin base and Gabapentin    Family History:  family history includes Cancer in her father and mother; Diabetes in her father and mother; Heart Defect in her mother; Hypertension in her mother; Hyperthyroidism in her mother.     Social History:   Social History     Occupational History    Not on file   Tobacco Use    Smoking status: Current Every Day Smoker Packs/day: 0.50     Types: Cigarettes    Smokeless tobacco: Never Used   Substance and Sexual Activity    Alcohol use: Never     Frequency: Never    Drug use: Never    Sexual activity: Not on file     Occupation: Works as a  full-time     OBJECTIVE:  Resp 12   Ht 5' 8\" (1.727 m)   Wt (!) 303 lb (137.4 kg)   BMI 46.07 kg/m²    Psych: alert and oriented to person, time, and place  Cardio:  well perfused extremities  Resp:  normal respiratory effort  Skin:  no cyanosis  Hem/lymph:  no lymphedema  Neuro:  sensation to light touch grossly intact throughout all nerve distributions in the foot   Musculoskeletal:    RLE:  Vascular: Toes warm and well perfused, compartments soft/compressible, no significant swelling of foot. Skin: Intact without rash/lesions/AV malformations. Strength: Able to fire/perform the following with appropriate strength:    [x]  Tib Ant:     [x]  Gastroc-Soleus:         [x]  Inversion:      []  Eversion:  weak, painful       [x]  FHL:     [x]  EHL:      Motion:  Normal for the following joints:    [x]  Ankle:      [x]  Subtalar:       [x]  1st MTP:        []  1st TMT:            Tenderness to Palpation:    Tenderness to palpation:  along course of peroneal tendons  -Equinus      LLE:  Vascular: Toes warm and well perfused, compartments soft/compressible, no significant swelling of foot. Skin: Intact without rash/lesions/AV malformations.   Strength: Able to fire/perform the following with appropriate strength:    [x]  Tib Ant:     [x]  Gastroc-Soleus:         [x]  Inversion:      []  Eversion:  weak, painful       [x]  FHL:     [x]  EHL:      Motion:  Normal for the following joints:    [x]  Ankle:      [x]  Subtalar:       [x]  1st MTP:        []  1st TMT:            Tenderness to Palpation:    Tenderness to palpation:  along course of peroneal tendons  -Equinus    Instability:   -Talar tilt: Negative  -Anterior drawer: Negative  -No peroneal subluxation/dislocation with dorsiflexion + eversion or with circumduction      RADIOLOGY:   5/10/2021 No new radiology images today. Prior images reviewed for reference. FINDINGS:  Three weightbearing views (AP, Mortise, and Lateral) of the bilateral ankle and three weightbearing views (AP, Oblique, Lateral) of the bilateral foot were obtained in the office today and reviewed, revealing no acute fracture, dislocation, or radioopaque foreign body/tumor. The ankle mortise is maintained with no widening of the clear spaces. Narrowed talocalcaneal angle worse on the left than on the right. IMPRESSION:  No acute fracture/dislocation. Electronically signed by Genet Gallo MD      ASSESSMENT AND PLAN:  Body mass index is 46.07 kg/m². She has bilateral (left greater than right) peroneal tendinopathy, with underlying mild cavus radiographically and equinus. She also reports a history of left ankle instability (negative physical exam findings). Notably, she has a complex past medical history. She has a history of heart disease, insulin-dependent diabetes with neuropathy, history of CRPS type II, and tobacco use (reports she smokes 1/2 pack cigarettes per day). We had a discussion today about the likely diagnosis and its natural history, physical exam and imaging findings, as well as various treatment options in detail. Surgically, we discussed the possible left peroneal tendon repair/debridement/tenolysis and/or tendon transfer, depending on her imaging as below. We discussed the expected postoperative course, including the relevant weightbearing restrictions and immobilization. At today's visit, she reports that her pain is getting worse despite conservative management, she is interested in possibly proceeding with surgery in the future.   We did discuss her course of conservative management -- she has not been using her cam boot, reports that she did obtain the over-the-counter cavus style orthotic but reports that it is in her other shoes, has been to physical therapy several times over the past few weeks, and reports that the Voltaren gel does not help her pain. Given her history of CRPS type II, and her insulin-dependent diabetes with neuropathy as well as her tobacco use, she does have significant risk factors for postoperative complications. Orders/referrals were placed as below at today's visit. She may continue to use the Voltaren gel, over-the-counter cavus orthotics, home exercises per physical therapy for my peroneal tendinopathy protocol, and the cam boot as needed. In order to know exactly how to proceed with treatment (surgical versus nonsurgical, as well as how), an MRI was ordered today to evaluate her peroneal tendons. This is medically necessary to evaluate the structures in this area, for both diagnosis and treatment. All questions were answered and the above plan was agreed upon. The patient will return to clinic after her MRI without x-rays. At her next visit, we will review her MRI, and discuss a possible future surgery again, particularly in the context of her significant underlying risk factors as above (CRPS type II, insulin-dependent diabetes with neuropathy, and tobacco use). At the patient's next visit, depending on how the patient is doing and/or new imaging/labs results, we may consider the following options:    []  Orthotic (OTC)     []  Orthotic (custom)          []  Rocker bottom shoes     []  Brace (OTC)        []  Brace (custom)             []  CAM boot        []  Night splint         []  Heel cups        []  Strap      []  Toe sleeves/splints    []  PT:                     []  Wean out of immobilization   []  Advance activity       []  Topical               []  NSAIDs          []  Evelio         []  Referral:         []  Stress xrays       []  CT         []  MRI        []  Injection:         []  Consider OR      []  Pick OR date    No follow-ups on file.     No orders of the defined types were placed in this encounter. Orders Placed This Encounter   Procedures    MRI FOOT LEFT WO CONTRAST     MUST BE SCHEDULED AT [facility]  MUST BE DONE ON 3T MAGNET OR GREATER  Please have read by [facility] Radiologist     Standing Status:   Future     Standing Expiration Date:   5/10/2022     Order Specific Question:   Reason for exam:     Answer:   stefania iverson    MRI ANKLE LEFT WO CONTRAST     MUST BE SCHEDULED AT [facility]  MUST BE DONE ON 3T MAGNET OR GREATER  Please have read by [facility] Radiologist     Standing Status:   Future     Standing Expiration Date:   5/10/2022     Order Specific Question:   Reason for exam:     Answer:   stefania Christopher MD  Orthopedic Surgery        Please excuse any typos/errors, as this note was created with the assistance of voice recognition software. While intending to generate a document that actually reflects the content of the visit, the document can still have some errors including those of syntax and sound-a-like substitutions which may escape proof reading. In such instances, actual meaning can be extrapolated by context.

## 2021-05-25 ENCOUNTER — HOSPITAL ENCOUNTER (OUTPATIENT)
Dept: MRI IMAGING | Age: 36
Discharge: HOME | End: 2021-05-27
Payer: COMMERCIAL

## 2021-05-25 DIAGNOSIS — M21.6X9 CAVUS DEFORMITY OF FOOT, ACQUIRED: ICD-10-CM

## 2021-05-25 DIAGNOSIS — M76.71 PERONEAL TENDINITIS OF BOTH LOWER LEGS: ICD-10-CM

## 2021-05-25 DIAGNOSIS — M21.869 GASTROCNEMIUS EQUINUS, UNSPECIFIED LATERALITY: ICD-10-CM

## 2021-05-25 DIAGNOSIS — M25.372 INSTABILITY OF LEFT ANKLE JOINT: ICD-10-CM

## 2021-05-25 DIAGNOSIS — M76.72 PERONEAL TENDINITIS OF BOTH LOWER LEGS: ICD-10-CM

## 2021-05-25 PROCEDURE — 73721 MRI JNT OF LWR EXTRE W/O DYE: CPT

## 2021-05-25 PROCEDURE — 73718 MRI LOWER EXTREMITY W/O DYE: CPT

## 2021-06-08 ENCOUNTER — OFFICE VISIT (OUTPATIENT)
Dept: ORTHOPEDIC SURGERY | Age: 36
End: 2021-06-08
Payer: COMMERCIAL

## 2021-06-08 VITALS — WEIGHT: 293 LBS | RESPIRATION RATE: 12 BRPM | HEIGHT: 68 IN | TEMPERATURE: 98.4 F | BODY MASS INDEX: 44.41 KG/M2

## 2021-06-08 DIAGNOSIS — M76.72 PERONEAL TENDINITIS OF BOTH LOWER LEGS: Primary | ICD-10-CM

## 2021-06-08 DIAGNOSIS — M21.6X9 CAVUS DEFORMITY OF FOOT, ACQUIRED: ICD-10-CM

## 2021-06-08 DIAGNOSIS — M21.869 GASTROCNEMIUS EQUINUS, UNSPECIFIED LATERALITY: ICD-10-CM

## 2021-06-08 DIAGNOSIS — M25.372 INSTABILITY OF LEFT ANKLE JOINT: ICD-10-CM

## 2021-06-08 DIAGNOSIS — M76.71 PERONEAL TENDINITIS OF BOTH LOWER LEGS: Primary | ICD-10-CM

## 2021-06-08 PROCEDURE — 99214 OFFICE O/P EST MOD 30 MIN: CPT | Performed by: ORTHOPAEDIC SURGERY

## 2021-06-08 NOTE — LETTER
Dr. Ani Sellers  [address] [facility] [facility]  [phone]        6/8/2021     Patient: Lola Gar  YOB: 1985    Dear CLARK Lin CNP,    I had the pleasure of seeing one of your patients, Lola Gar, recently in the office. We have decided to proceed with surgery, and the patient may be reaching out to your office in the near future for medical clearance/optimization. Below are the relevant portions of my assessment and plan of care. ASSESSMENT AND PLAN:  She has bilateral (left greater than right) peroneal tendinopathy, with underlying mild cavus radiographically and equinus. She also reports a history of left ankle instability (negative physical exam findings). Notably, she has a complex past medical history. She has a history of heart disease, insulin-dependent diabetes with neuropathy, history of CRPS type II (she is unable to clarify details surrounding this diagnosis), and tobacco use (reports she smokes 1/2 pack cigarettes per day). We had a discussion today about the likely diagnosis and its natural history, physical exam and imaging findings, as well as various treatment options in detail. Surgically, we discussed a left peroneal tendon repair/debridement/tenolysis and/or tendon transfer. We again discussed the expected postoperative course, including the relevant weightbearing restrictions and immobilization. Due to the fact the patient is experiencing significant pain which has a significant impact on her quality of life, she does wish to proceed with surgery in the near future. She reports that she is currently only able to [facility] (about 10 minutes) due to her pain. I do believe that surgery may offer her benefit, and we did discuss her medical problems in the context of surgery and her outcome.  Given her past medical history as above, we discussed that the patient is at higher risk for complications and a compromise outcome. We did discuss the possibility of a CRPS flareup, and we discussed that this could make her pain worse, and she and her  expressed verbal understanding of this. We discussed nonoperative treatment options again as well today, however she is not interested in pursuing more nonoperative management, and does wish to proceed with surgery. Given her history of CRPS type II, and her insulin-dependent diabetes with neuropathy as well as her tobacco use, she does have significant risk factors for postoperative complications. Orders/referrals were placed as below at today's visit. She may continue to use Voltaren gel, over-the-counter cavus orthotics, home exercises per physical therapy for her peroneal tendons, and her cam boot as needed for pain. At today's visit, the patient was ordered DME as below. I also ordered physical therapy for the patient to help reinforce/teach the relevant weight bearing precautions, to help allow for safe transfers and early mobilization, as well as effectively utilize DME. Surgical booking paperwork was completed and submitted. All questions were answered and the above plan was agreed upon. The patient will return to clinic for a surgical discussion. At her next visit, her medical and cardiology clearances will be reviewed, her hemoglobin A1c will be reviewed, and we will again discuss surgery. Thank you in advance! I look forward to serving you and your patients again in the future. Please don't hesitate to contact me at my mobile number .         Marielos Christopher MD  Orthopedic Surgery, Foot and Ankle

## 2021-06-16 DIAGNOSIS — M79.672 LEFT FOOT PAIN: Primary | ICD-10-CM

## 2021-07-07 ENCOUNTER — HOSPITAL ENCOUNTER (OUTPATIENT)
Dept: PHYSICAL THERAPY | Age: 36
Setting detail: THERAPIES SERIES
Discharge: HOME | End: 2021-07-07
Payer: COMMERCIAL

## 2021-07-07 PROCEDURE — 97161 PT EVAL LOW COMPLEX 20 MIN: CPT | Performed by: PHYSICAL THERAPIST

## 2021-07-07 NOTE — PROGRESS NOTES
Physical Therapy  [facility]  Rehabilitation and Sports Medicine    [X] Fisher Phone: [phone] Fax: [phone]   [ ] Toni Phone: [phone] Fax: [phone]    Physical Therapy Pre-Op Note    Date: 2021    Patient Name: Rosa Flores  : 1985     MRN: [id number]    Time In: 12:35 pm    Time Out: 1:12 pm    Subjective: Patient arrives to appointment with left ankle/foot pain, she is having left ankle/foot surgery next month. Patient has antalgia with gait, without brace or use of an assistive device. She lives in a 1 story home with 3 [place] with railing on R side ascending (front entrance) or 2 [place] without rail (side entrance), bathroom has shower with shower seat and handheld shower head: lives with  and mom ( took off day of surgery, works 2nd shift, mom will be home during the day and available to assist patient as needed), currently does not use assistive devices, discharge plan is to go home; back up plan: possibly mother's house if a ramp could be made. Pain:  8-9/10 in L lateral ankle    Surgery date: 2021    Objective:     AROM:  L ankle/foot: WFL except DF which is limited to approximately neutral      MMT:   L ankle: DF/PF 4+/5; inver/ever 4-/5, painful with resisted inversion and eversion    Home Exercise Program: Dr. Mary Valderrama pre-op protocol was reviewed  [X] Reviewed/Progressed HEP activities related to strengthening, flexibility, endurance, ROM. (29787)  [X] Reviewed/Progressed HEP activities related to improving balance, coordination, kinesthetic sense, posture, motor skill, proprioception. (21790)    Reviewed weightbearing precautions, elevation, home mobility including stairs, bathroom access, and safety concerns. Reviewed proper fit and use of assistive devices including rolling walker (2 wheels), axillary crutches, and knee scooter. Patient plans to use 2-wheeled rolling walker, possibly also crutches for stairs.  Patient instructed on and correctly demonstrated NWB ambulation and sit-to-stand transfer in clinic using rolling walker. Total Treatment Minutes: 40'    Prognosis: [X] Good  [ ] Piedad Ramirez   [ ] Poor    Patient treated by Beny CULP, PTA under direct, one-on-one supervision of licensed PT.   Electronically signed by: Marita Stone, PT, DPT

## 2021-07-21 ENCOUNTER — HOSPITAL ENCOUNTER (OUTPATIENT)
Dept: PREADMISSION TESTING | Age: 36
Discharge: HOME | End: 2021-07-25
Payer: COMMERCIAL

## 2021-07-21 VITALS
DIASTOLIC BLOOD PRESSURE: 84 MMHG | WEIGHT: 293 LBS | OXYGEN SATURATION: 97 % | SYSTOLIC BLOOD PRESSURE: 137 MMHG | HEART RATE: 90 BPM | RESPIRATION RATE: 16 BRPM | BODY MASS INDEX: 44.41 KG/M2 | HEIGHT: 68 IN | TEMPERATURE: 96.8 F

## 2021-07-21 LAB
ABSOLUTE EOS #: 0.18 K/UL (ref 0–0.44)
ABSOLUTE IMMATURE GRANULOCYTE: 0.08 K/UL (ref 0–0.3)
ABSOLUTE LYMPH #: 3.47 K/UL (ref 1.1–3.7)
ABSOLUTE MONO #: 0.86 K/UL (ref 0.1–1.2)
ANION GAP SERPL CALCULATED.3IONS-SCNC: 10 MMOL/L (ref 9–17)
BASOPHILS # BLD: 1 % (ref 0–2)
BASOPHILS ABSOLUTE: 0.08 K/UL (ref 0–0.2)
BUN BLDV-MCNC: 11 MG/DL (ref 6–20)
BUN/CREAT BLD: 15 (ref 9–20)
CALCIUM SERPL-MCNC: 9.4 MG/DL (ref 8.6–10.4)
CHLORIDE BLD-SCNC: 103 MMOL/L (ref 98–107)
CO2: 26 MMOL/L (ref 20–31)
CREAT SERPL-MCNC: 0.75 MG/DL (ref 0.5–0.9)
DIFFERENTIAL TYPE: ABNORMAL
EOSINOPHILS RELATIVE PERCENT: 1 % (ref 1–4)
ESTIMATED AVERAGE GLUCOSE: 148 MG/DL
GFR AFRICAN AMERICAN: >60 ML/MIN
GFR NON-AFRICAN AMERICAN: >60 ML/MIN
GFR SERPL CREATININE-BSD FRML MDRD: ABNORMAL ML/MIN/{1.73_M2}
GFR SERPL CREATININE-BSD FRML MDRD: ABNORMAL ML/MIN/{1.73_M2}
GLUCOSE BLD-MCNC: 149 MG/DL (ref 70–99)
HBA1C MFR BLD: 6.8 % (ref 4–6)
HCT VFR BLD CALC: 47.3 % (ref 36.3–47.1)
HEMOGLOBIN: 15.2 G/DL (ref 11.9–15.1)
IMMATURE GRANULOCYTES: 1 %
LYMPHOCYTES # BLD: 24 % (ref 24–43)
MCH RBC QN AUTO: 27.3 PG (ref 25.2–33.5)
MCHC RBC AUTO-ENTMCNC: 32.1 G/DL (ref 28.4–34.8)
MCV RBC AUTO: 85.1 FL (ref 82.6–102.9)
MONOCYTES # BLD: 6 % (ref 3–12)
NRBC AUTOMATED: 0 PER 100 WBC
PDW BLD-RTO: 14.8 % (ref 11.8–14.4)
PLATELET # BLD: 359 K/UL (ref 138–453)
PLATELET ESTIMATE: ABNORMAL
PMV BLD AUTO: 9.2 FL (ref 8.1–13.5)
POTASSIUM SERPL-SCNC: 4.6 MMOL/L (ref 3.7–5.3)
RBC # BLD: 5.56 M/UL (ref 3.95–5.11)
RBC # BLD: ABNORMAL 10*6/UL
SEG NEUTROPHILS: 67 % (ref 36–65)
SEGMENTED NEUTROPHILS ABSOLUTE COUNT: 9.57 K/UL (ref 1.5–8.1)
SODIUM BLD-SCNC: 139 MMOL/L (ref 135–144)
WBC # BLD: 14.2 K/UL (ref 3.5–11.3)
WBC # BLD: ABNORMAL 10*3/UL

## 2021-07-21 PROCEDURE — 85025 COMPLETE CBC W/AUTO DIFF WBC: CPT

## 2021-07-21 PROCEDURE — 80048 BASIC METABOLIC PNL TOTAL CA: CPT

## 2021-07-21 PROCEDURE — 83036 HEMOGLOBIN GLYCOSYLATED A1C: CPT

## 2021-07-21 PROCEDURE — 93005 ELECTROCARDIOGRAM TRACING: CPT | Performed by: ORTHOPAEDIC SURGERY

## 2021-07-21 PROCEDURE — 36415 COLL VENOUS BLD VENIPUNCTURE: CPT

## 2021-07-21 RX ORDER — ACETAMINOPHEN 500 MG
1000 TABLET ORAL ONCE
Status: CANCELLED | OUTPATIENT
Start: 2021-08-05

## 2021-07-21 ASSESSMENT — PAIN DESCRIPTION - ORIENTATION: ORIENTATION: LEFT

## 2021-07-21 ASSESSMENT — PAIN DESCRIPTION - FREQUENCY: FREQUENCY: CONTINUOUS

## 2021-07-21 ASSESSMENT — PAIN DESCRIPTION - PAIN TYPE: TYPE: CHRONIC PAIN

## 2021-07-21 ASSESSMENT — PAIN DESCRIPTION - LOCATION: LOCATION: FOOT

## 2021-07-21 ASSESSMENT — PAIN SCALES - GENERAL: PAINLEVEL_OUTOF10: 7

## 2021-07-21 ASSESSMENT — PAIN DESCRIPTION - DESCRIPTORS: DESCRIPTORS: ACHING;THROBBING

## 2021-07-21 NOTE — H&P
[facility] Physical [facility]   [facility]    HISTORY AND PHYSICAL EXAMINATION            Date of Evaluation: 7/21/2021  Patient name:  Francisca Castro  MRN:   [id number]  YOB: 1985  PCP:    CLARK Potts CNP    History Obtained From:     Patient, medical records    History of Present Illness: This is Francisca Castro a 29 y.o. female who presents for a pre-admission testing appointment for an upcoming left peroneal tendon debridement by Dr. Sagra Delgado scheduled on 8/5/2021 at 0730 due to left peroneal tendinopathy. The patient's chief complaint is 6-7/10 left footpain that has progressively worsened over the past 5 years. Patient had left ankle fracture and arthroscopy in 2/2016 and has continued pain since then. Her ankle has popping and cracking with movement. Patient states that at times, she feels a Tori pop and it feels good right after. \" Left ankle pain is aggravated by standing and sitting for long periods and is minimally relieved with Ibuprofen. Prior treatment includes physical therapy. Denies recent falls and injuries. Functional Capacity per pt:   1) Pt is able to walk 2 city blocks on level ground without SOB. 2) Pt is able to climb 2 flights of stairs without SOB. 3) Pt is able to walk up a hill for 1-2 city blocks without SOB. Past Medical History:     Past Medical History:   Diagnosis Date    Depression     GERD (gastroesophageal reflux disease)     acid reflux    Hyperlipemia     Hypertension     Hypothyroid     Neuropathy     Tachycardia     Type 2 diabetes mellitus (HCC)         Past Surgical History:     Past Surgical History:   Procedure Laterality Date    [facility]    ANKLE FRACTURE SURGERY      HYSTERECTOMY      LEEP          Medications Prior to Admission:     Prior to Admission medications    Medication Sig Start Date End Date Taking?  Authorizing Provider   TRULICITY 1.5 VK/2.4QP SOPN Inject 1.5 mg into the skin once a week On wednesdays 3/29/21   Historical Provider, MD   furosemide (LASIX) 40 MG tablet Take 40 mg by mouth as needed For edema 3/30/21   Historical Provider, MD   ibuprofen (ADVIL;MOTRIN) 200 MG CAPS Take 2 tablets by mouth as needed    Historical Provider, MD   Ankle Foot Arthosis MISC by Does not apply route Use lateral flare to correct varus L side  Peroneal tendinitis of left lower extremity  (primary encounter diagnosis)    Left foot pain    Acquired heel varus of left foot 4/6/21   Harris Briggs DPM   glimepiride (AMARYL) 4 MG tablet Take 4 mg by mouth 2 times daily  3/23/18   Historical Provider, MD   metFORMIN (GLUCOPHAGE-XR) 750 MG extended release tablet Take 750 mg by mouth 2 times daily     Historical Provider, MD   LEVEMIR FLEXTOUCH 100 UNIT/ML injection pen Inject 40 Units into the skin nightly  5/2/19   Historical Provider, MD   metoprolol succinate (TOPROL XL) 100 MG extended release tablet Take 100 mg by mouth daily  2/13/17   Historical Provider, MD   atorvastatin (LIPITOR) 20 MG tablet Take 20 mg by mouth daily  5/16/19   Historical Provider, MD   famotidine (PEPCID) 40 MG tablet Take 40 mg by mouth 2 times daily  5/30/19   Historical Provider, MD   sertraline (ZOLOFT) 50 MG tablet Take by mouth daily  5/30/19   Historical Provider, MD   ferrous sulfate 325 (65 Fe) MG tablet Take 325 mg by mouth daily (with breakfast)  5/16/19   Historical Provider, MD   levothyroxine (SYNTHROID) 50 MCG tablet Take 50 mcg by mouth Daily  5/30/19   Historical Provider, MD   losartan (COZAAR) 25 MG tablet Take 25 mg by mouth daily  6/20/19   Historical Provider, MD        Allergies:     Erythromycin base and Gabapentin    Social History:     Tobacco:    reports that she quit smoking 9 days ago. Her smoking use included cigarettes. She smoked 0.50 packs per day. She has never used smokeless tobacco.  Alcohol:      reports no history of alcohol use.   Drug Use:  reports no history of drug use. Family History:     Family History   Problem Relation Age of Onset    Diabetes Mother     Cancer Mother     Hypertension Mother     Hyperthyroidism Mother     Heart Defect Mother     Cancer Father     Diabetes Father        Review of Systems:     Positive and Negative as described in HPI. CONSTITUTIONAL:  Negative for fevers, chills, sweats, fatigue, and weight loss. HEENT: Wears glasses. Runny nose with seasonal allergies. Negative for hearing changes, and throat pain. RESPIRATORY: Cough due to seasonal allergies. Negative for shortness of breath, congestion, and wheezing. CARDIOVASCULAR: Tachycardia - controlled with medication (previously 's with severe palpitations) HTN. Negative for chest pain, blood clot, irregular heartbeat, and palpitations. GASTROINTESTINAL: GERD Nausea night of/morning after taking Trulicity Negative for vomiting, diarrhea, constipation, change in bowel habits, and abdominal pain. GENITOURINARY:  Negative for difficulty of urination, burning with urination, and frequency. INTEGUMENT: Multiple mosquito bites bilateral feet. Negative for rash and easy bruising. HEMATOLOGIC/LYMPHATIC: Left ankle edema   ALLERGIC/IMMUNOLOGIC: Itching with mosquito bites. Psoriasis on left foot. Negative for urticaria. ENDOCRINE: Diabetes - managed by Claudette Martínez. Last A1C 6.9 (last drawn 6/2021) Hypothyroid. Increase in thirst. Negative for increase in urination, and heat or cold intolerance. MUSCULOSKELETAL: See HPI  NEUROLOGICAL: Neuropathy in left foot. Negative for headaches, dizziness, lightheadedness  BEHAVIOR/PSYCH: Depression Negative for anxiety. Physical Exam:   /84   Pulse 90   Temp 96.8 °F (36 °C) (Temporal)   Resp 16   Ht 5' 8\" (1.727 m)   Wt 300 lb (136.1 kg)   SpO2 97%   BMI 45.61 kg/m²   No LMP recorded. Patient has had a hysterectomy. No obstetric history on file. No results for input(s): POCGLU in the last 72 hours. General Appearance:  Alert, well appearing, and in no acute distress. Morbidly obese  Mental status:  Oriented to person, place, and time. Head:  Normocephalic and atraumatic. Eye: Wearing glasses No icterus, redness, pupils equal and reactive, extraocular eye movements intact, and conjunctiva clear. Ear:  Hearing grossly intact. Nose:  No drainage noted. Mouth: Top teeth edentulous Mucous membranes moist.  Neck:  Supple and no carotid bruits noted. Lungs:  Bilateral equal air entry, clear to auscultation, no wheezing, rales or rhonchi, and normal effort. Cardiovascular:  Normal rate, regular rhythm, no murmur, gallop, or rub. Abdomen: Soft, rounded nontender, nondistended, and active bowel sounds. Neurologic:  Normal speech and cranial nerves II through XII grossly intact. Strength 5/5 bilaterally. Skin: Small rounded psoriasis lesion left dorsal foot with no surrounding erythema or open areas. No gross lesions, rashes, bruising, or bleeding on exposed skin area. Extremities: Trace edema left ankle. Posterior tibial pulses 2+ bilaterally. No calf tenderness with palpation. Psych: Normal affect.      Investigations:      Laboratory Testing:  Recent Results (from the past 24 hour(s))   CBC Auto Differential    Collection Time: 07/21/21  1:18 PM   Result Value Ref Range    WBC 14.2 (H) 3.5 - 11.3 k/uL    RBC 5.56 (H) 3.95 - 5.11 m/uL    Hemoglobin 15.2 (H) 11.9 - 15.1 g/dL    Hematocrit 47.3 (H) 36.3 - 47.1 %    MCV 85.1 82.6 - 102.9 fL    MCH 27.3 25.2 - 33.5 pg    MCHC 32.1 28.4 - 34.8 g/dL    RDW 14.8 (H) 11.8 - 14.4 %    Platelets 117 706 - 964 k/uL    MPV 9.2 8.1 - 13.5 fL    NRBC Automated 0.0 0.0 per 100 WBC    Differential Type NOT REPORTED     Seg Neutrophils 67 (H) 36 - 65 %    Lymphocytes 24 24 - 43 %    Monocytes 6 3 - 12 %    Eosinophils % 1 1 - 4 %    Basophils 1 0 - 2 %    Immature Granulocytes 1 (H) 0 %    Segs Absolute 9.57 (H) 1.50 - 8.10 k/uL    Absolute Lymph # 3.47 1.10 - 3.70 k/uL the bilateral foot were obtained in the office today and reviewed, revealing no acute fracture, dislocation, or radioopaque foreign body/tumor. The ankle mortise is maintained with no widening of the clear spaces. Narrowed talocalcaneal angle worse on the left than on the right.     IMPRESSION:  No acute fracture/dislocation.     Electronically signed by Татьяна Correa MD    EK2021: Normal sinus rhythm. Normal ECG. See Epic. Diagnosis:      1. Left peroneal tendinopathy    Plans:     1.  Left peroneal tendon debridement      Celina Lynn, CLARK - CNP  2021  1:52 PM

## 2021-07-21 NOTE — PRE-PROCEDURE INSTRUCTIONS
[address] [place] [address] Thursday, august 5, 2021 at 0530 AM    Once you enter the hospital lobby, take the elevators to the second floor. Check-In is at the surgery registration desk. Continue to take your home medications as you normally do up to and including the night before surgery with the exception of any blood thinning medications. Please stop any blood thinning medications as directed by your surgeon or prescribing physician. Failure to stop certain medications may interfere with your scheduled surgery. These may include:  Aspirin, Warfarin (Coumadin), Clopidogrel (Plavix), Ibuprofen (Motrin, Advil), Naproxen (Aleve), Meloxicam (Mobic), Celecoxib (Celebrex), Eliquis, Pradaxa, Xarelto, Effient, Fish Oil, Herbal supplements. Stop taking any blood thinning 7 days prior to surgery per physician direction    If you are diabetic, do not take any of your diabetic medications by mouth the morning of surgery. If you are taking insulin contact the doctor that manages your diabetes for instructions about any changes to your insulin dosages the day before surgery. Do not inject insulin or other injectable diabetic medications the morning of surgery unless otherwise instructed by the doctor who manages your diabetes. Please take the following medication(s) the day of surgery with a small sip of water:  Metoprolol, levothyroxine, famotidine         PREPARING FOR YOUR SURGERY:     Before surgery, you can play an important role in your own health. Because skin is not sterile, we need to be sure that your skin is as free of germs as possible before surgery by carefully washing before surgery. Preparing or prepping skin before surgery can reduce the risk of a surgical site infection.   Do not shave the area of your body where your surgery will be performed unless you received specific permission from your physician.     You will need to shower at home the night before surgery and the morning of surgery with a special soap called chlorhexidine gluconate (CHG*). *Not to be used by people allergic to Chlorhexidine Gluconate (CHG). Following these instructions will help you be sure that your skin is clean before surgery. Instructions on cleaning your skin before surgery: The night before your surgery:      You will need to shower with warm water (not hot) and the CHG soap.  Use a clean wash cloth and a clean towel. Have clean clothes available to put on after the shower.   First wash your hair with regular shampoo. Rinse your hair and body thoroughly to remove the shampoo.  Wash your face and genital area (private parts) with your regular soap or water only. Thoroughly rinse your body with warm water from the neck down.  Turn water off to prevent rinsing the soap off too soon.  With a clean wet washcloth and half of the CHG soap in the bottle, lather your entire body from the neck down. Do not use CHG soap near your eyes or ears to avoid injury to those areas.  Wash thoroughly, paying special attention to the area where your surgery will be performed.  Wash your body gently for five (5) minutes. Avoid scrubbing your skin too hard.  Turn the water back on and rinse your body thoroughly.  Pat yourself dry with a clean, soft towel. Do not apply lotion, cream or powder.  Dress with clean freshly washed clothes. The morning of surgery:     Repeat shower following steps above - using remaining half of CHG soap in bottle. Patient Instructions:    James Kennedy If you are having any type of anesthesia you are to have nothing to eat or drink after midnight the night before your surgery. This includes gum, hard candy, mints, water or smoking or chewing tobacco.  The only exception to this is a small sip of water to take with any morning dose of heart, blood pressure, or seizure medications. No alcoholic beverages for 24 hours prior to surgery.      Brush your teeth but do not swallow water.  Bring your eye glasses and case with you. No contacts are to be worn the day of surgery. You also may bring your hearing aids. Most surgical procedures involving anesthesia will require that you remove your dentures prior to surgery. · Do not wear any jewelry or body piercings day of surgery. Also, NO lotion, perfume or deodorant to be used the day of surgery. No nail polish on the operative extremity (arm/leg surgeries)    · If you are staying overnight with us, please bring a small bag of necessary personal items.  Please wear loose, comfortable clothing. If you are potentially going to have a cast or brace bring clothing that will fit over them.  In case of illness - If you have cold or flu like symptoms (high fever, runny nose, sore throat, cough, etc.) rash, nausea, vomiting, loose stools, and/or recent contact with someone who has a contagious disease (chicken pox, measles, etc.) Please call your doctor before coming to the hospital.         Day of Surgery/Procedure:    As a patient at [facility] - INPATIENT you can expect quality medical and nursing care that is centered on your individual needs. Our goal is to make your surgical experience as comfortable as possible    . Transportation After Your Surgery/Procedure: You will need a friend or family member to drive you home after your procedure. Your  must be 25years of age or older and able to sign off on your discharge instructions. A taxi cab or any other form of public transportation is not acceptable. Your friend or family member must stay at the hospital throughout your procedure. Someone must remain with you for the first 24 hours after your surgery if you receive anesthesia or medication. If you do not have someone to stay with you, your procedure may be cancelled.       If you have any other questions regarding your procedure or the day of surgery, please call [phone]      _________________________  ____________________________  Signature (Patient)              Signature (Provider)               Date

## 2021-07-21 NOTE — H&P (VIEW-ONLY)
History and Physical Service   [facility]    HISTORY AND PHYSICAL EXAMINATION            Date of Evaluation: 7/21/2021  Patient name:  Orquidea Bowles  MRN:   [id number]  YOB: 1985  PCP:    CLARK Iverson CNP    History Obtained From:     Patient, medical records    History of Present Illness: This is Orquidea Bowles a 29 y.o. female who presents for a pre-admission testing appointment for an upcoming left peroneal tendon debridement by Dr. Lavinia Beard scheduled on 8/5/2021 at 0730 due to left peroneal tendinopathy. The patient's chief complaint is 6-7/10 left footpain that has progressively worsened over the past 5 years. Patient had left ankle fracture and arthroscopy in 2/2016 and has continued pain since then. Her ankle has popping and cracking with movement. Patient states that at times, she feels a Tori pop and it feels good right after. \" Left ankle pain is aggravated by standing and sitting for long periods and is minimally relieved with Ibuprofen. Prior treatment includes physical therapy. Denies recent falls and injuries. Functional Capacity per pt:   1) Pt is able to walk 2 city blocks on level ground without SOB. 2) Pt is able to climb 2 flights of stairs without SOB. 3) Pt is able to walk up a hill for 1-2 city blocks without SOB. Past Medical History:     Past Medical History:   Diagnosis Date    Depression     GERD (gastroesophageal reflux disease)     acid reflux    Hyperlipemia     Hypertension     Hypothyroid     Neuropathy     Tachycardia     Type 2 diabetes mellitus (HCC)         Past Surgical History:     Past Surgical History:   Procedure Laterality Date    [facility]    ANKLE FRACTURE SURGERY      HYSTERECTOMY      LEEP          Medications Prior to Admission:     Prior to Admission medications    Medication Sig Start Date End Date Taking?  Authorizing Provider   TRULICITY 1.5 HQ/6.5FB SOPN Inject 1.5 mg into the skin once a week On wednesdays 3/29/21   Historical Provider, MD   furosemide (LASIX) 40 MG tablet Take 40 mg by mouth as needed For edema 3/30/21   Historical Provider, MD   ibuprofen (ADVIL;MOTRIN) 200 MG CAPS Take 2 tablets by mouth as needed    Historical Provider, MD   Ankle Foot Arthosis MISC by Does not apply route Use lateral flare to correct varus L side  Peroneal tendinitis of left lower extremity  (primary encounter diagnosis)    Left foot pain    Acquired heel varus of left foot 4/6/21   Harris Briggs DPM   glimepiride (AMARYL) 4 MG tablet Take 4 mg by mouth 2 times daily  3/23/18   Historical Provider, MD   metFORMIN (GLUCOPHAGE-XR) 750 MG extended release tablet Take 750 mg by mouth 2 times daily     Historical Provider, MD   LEVEMIR FLEXTOUCH 100 UNIT/ML injection pen Inject 40 Units into the skin nightly  5/2/19   Historical Provider, MD   metoprolol succinate (TOPROL XL) 100 MG extended release tablet Take 100 mg by mouth daily  2/13/17   Historical Provider, MD   atorvastatin (LIPITOR) 20 MG tablet Take 20 mg by mouth daily  5/16/19   Historical Provider, MD   famotidine (PEPCID) 40 MG tablet Take 40 mg by mouth 2 times daily  5/30/19   Historical Provider, MD   sertraline (ZOLOFT) 50 MG tablet Take by mouth daily  5/30/19   Historical Provider, MD   ferrous sulfate 325 (65 Fe) MG tablet Take 325 mg by mouth daily (with breakfast)  5/16/19   Historical Provider, MD   levothyroxine (SYNTHROID) 50 MCG tablet Take 50 mcg by mouth Daily  5/30/19   Historical Provider, MD   losartan (COZAAR) 25 MG tablet Take 25 mg by mouth daily  6/20/19   Historical Provider, MD        Allergies:     Erythromycin base and Gabapentin    Social History:     Tobacco:    reports that she quit smoking 9 days ago. Her smoking use included cigarettes. She smoked 0.50 packs per day. She has never used smokeless tobacco.  Alcohol:      reports no history of alcohol use.   Drug Use:  reports no history of drug use. Family History:     Family History   Problem Relation Age of Onset    Diabetes Mother     Cancer Mother     Hypertension Mother     Hyperthyroidism Mother     Heart Defect Mother     Cancer Father     Diabetes Father        Review of Systems:     Positive and Negative as described in HPI. CONSTITUTIONAL:  Negative for fevers, chills, sweats, fatigue, and weight loss. HEENT: Wears glasses. Runny nose with seasonal allergies. Negative for hearing changes, and throat pain. RESPIRATORY: Cough due to seasonal allergies. Negative for shortness of breath, congestion, and wheezing. CARDIOVASCULAR: Tachycardia - controlled with medication (previously 's with severe palpitations) HTN. Negative for chest pain, blood clot, irregular heartbeat, and palpitations. GASTROINTESTINAL: GERD Nausea night of/morning after taking Trulicity Negative for vomiting, diarrhea, constipation, change in bowel habits, and abdominal pain. GENITOURINARY:  Negative for difficulty of urination, burning with urination, and frequency. INTEGUMENT: Multiple mosquito bites bilateral feet. Negative for rash and easy bruising. HEMATOLOGIC/LYMPHATIC: Left ankle edema   ALLERGIC/IMMUNOLOGIC: Itching with mosquito bites. Psoriasis on left foot. Negative for urticaria. ENDOCRINE: Diabetes - managed by Armen Mckay. Last A1C 6.9 (last drawn 6/2021) Hypothyroid. Increase in thirst. Negative for increase in urination, and heat or cold intolerance. MUSCULOSKELETAL: See HPI  NEUROLOGICAL: Neuropathy in left foot. Negative for headaches, dizziness, lightheadedness  BEHAVIOR/PSYCH: Depression Negative for anxiety. Physical Exam:   /84   Pulse 90   Temp 96.8 °F (36 °C) (Temporal)   Resp 16   Ht 5' 8\" (1.727 m)   Wt 300 lb (136.1 kg)   SpO2 97%   BMI 45.61 kg/m²   No LMP recorded. Patient has had a hysterectomy. No obstetric history on file. No results for input(s): POCGLU in the last 72 hours. the bilateral foot were obtained in the office today and reviewed, revealing no acute fracture, dislocation, or radioopaque foreign body/tumor. The ankle mortise is maintained with no widening of the clear spaces. Narrowed talocalcaneal angle worse on the left than on the right.     IMPRESSION:  No acute fracture/dislocation.     Electronically signed by Clara De Leon MD    EK2021: Normal sinus rhythm. Normal ECG. See Epic. Diagnosis:      1. Left peroneal tendinopathy    Plans:     1.  Left peroneal tendon debridement      Cyril Lynn, CLARK - MARTINEZ  2021  1:52 PM

## 2021-07-22 LAB
EKG ATRIAL RATE: 88 BPM
EKG P AXIS: 55 DEGREES
EKG P-R INTERVAL: 198 MS
EKG Q-T INTERVAL: 376 MS
EKG QRS DURATION: 82 MS
EKG QTC CALCULATION (BAZETT): 454 MS
EKG R AXIS: 20 DEGREES
EKG T AXIS: 51 DEGREES
EKG VENTRICULAR RATE: 88 BPM

## 2021-07-22 NOTE — PROGRESS NOTES
Spoke with Morenita Maradiaga at Dr. Charlene Singh office and we verbally talked about high white count and A1C.  Morenita Maradiaga did speak with Dr. Belen Dennis and he is aware of the results

## 2021-07-23 ENCOUNTER — OFFICE VISIT (OUTPATIENT)
Dept: ORTHOPEDIC SURGERY | Age: 36
End: 2021-07-23

## 2021-07-23 VITALS — TEMPERATURE: 97.6 F | BODY MASS INDEX: 44.41 KG/M2 | HEIGHT: 68 IN | RESPIRATION RATE: 12 BRPM | WEIGHT: 293 LBS

## 2021-07-23 DIAGNOSIS — M21.869 GASTROCNEMIUS EQUINUS, UNSPECIFIED LATERALITY: ICD-10-CM

## 2021-07-23 DIAGNOSIS — M21.6X9 CAVUS DEFORMITY OF FOOT, ACQUIRED: ICD-10-CM

## 2021-07-23 DIAGNOSIS — M76.71 PERONEAL TENDINITIS OF BOTH LOWER LEGS: Primary | ICD-10-CM

## 2021-07-23 DIAGNOSIS — M76.72 PERONEAL TENDINITIS OF BOTH LOWER LEGS: Primary | ICD-10-CM

## 2021-07-23 PROCEDURE — 99213 OFFICE O/P EST LOW 20 MIN: CPT | Performed by: ORTHOPAEDIC SURGERY

## 2021-07-23 NOTE — PROGRESS NOTES
[facility]  [place] [id number]  Dept: [phone]    Ambulatory Orthopedic Consult      CHIEF COMPLAINT:    Chief Complaint   Patient presents with    Ankle Pain     left       HISTORY OF PRESENT ILLNESS:      The patient is a 29 y.o. female who is being seen for evaluation of pain at the above location at the bilateral lateral hindfoot/ankle to the lateral midfoot, which began many years ago. The patient reports a progressive course. The patient has tried:      [x]  rest/activity modification          [x]  NSAIDs      []  opiates      [x]  orthotics        [x]  change in shoes   []  home exercises  [x]  physical therapy      []  CAM boot     [x]  brace:    []  injection:       [x]  surgery:      The patient is referred here today by Dr. Jennifer Ronquillo. She reports that she had surgery in February 2016 with Dr. Nacho Simon for her left foot \"tendons and ligaments\". She also reports a history of neuropathy and \"nerve damage\" in her left foot. INTERVAL HISTORY 5/10/2021:  She is seen again today in the office for follow up of a previous issue (as above). Since being seen last, the patient is doing worse. At today's visit, she is not using a brace or assistive device. History is obtained today from:   [x]  the patient     []  EMR     []  one family member/friend    []  multiple family members/friends    []  other:      INTERVAL HISTORY 6/8/2021:  She is seen again today in the office for follow up of imaging as below. Since being seen last, the patient is doing about the same overall. At today's visit, she is using no brace/assistive device. History is obtained today from:   [x]  the patient     [x]  EMR     [x]  one family member/friend  --[]  multiple family members/friends    []  other:      INTERVAL HISTORY 7/23/2021:  She is seen again today in the office for follow up of a previous issue (as above).  Since being seen last, she reports the problem has not significantly improved. At today's visit, she is using no brace/assistive device. She is here today to discuss surgery, and reports that she wishes to proceed with surgery in the near future. She denies any other clinically significant changes in history. History is obtained today from:   [x]  the patient     []  EMR     [x]  one family member/friend --patient's mother   []  multiple family members/friends    []  other:          REVIEW OF SYSTEMS:  Constitutional: Negative for fever. HENT: Negative for tinnitus. Eyes: Negative for pain. Respiratory: Negative for shortness of breath. Cardiovascular: Negative for chest pain. Gastrointestinal: Negative for abdominal pain. Genitourinary: Negative for dysuria. Skin: Negative for rash. Neurological: Negative for headaches. Hematological: Does not bruise/bleed easily. Musculoskeletal: See HPI for pertinent positives     Past Medical History:    She  has a past medical history of Depression, GERD (gastroesophageal reflux disease), Hyperlipemia, Hypertension, Hypothyroid, Neuropathy, Tachycardia, and Type 2 diabetes mellitus ([facility] [address] Past Surgical History:    She  has a past surgical history that includes Ankle fracture surgery; Ankle arthroscopy (Left, 02/2016); LEEP; and Hysterectomy.      Current Medications:     Current Outpatient Medications:     TRULICITY 1.5 FN/7.5IF SOPN, Inject 1.5 mg into the skin once a week On wednesdays, Disp: , Rfl:     furosemide (LASIX) 40 MG tablet, Take 40 mg by mouth as needed For edema, Disp: , Rfl:     ibuprofen (ADVIL;MOTRIN) 200 MG CAPS, Take 2 tablets by mouth as needed, Disp: , Rfl:     Ankle Foot Arthosis MISC, by Does not apply route Use lateral flare to correct varus L side Peroneal tendinitis of left lower extremity  (primary encounter diagnosis)  Left foot pain  Acquired heel varus of left foot, Disp: 1 each, Rfl: 0    glimepiride (AMARYL) 4 MG tablet, Take 4 mg by mouth 2 times daily , Disp: , Rfl:     metFORMIN (GLUCOPHAGE-XR) 750 MG extended release tablet, Take 750 mg by mouth 2 times daily , Disp: , Rfl:     LEVEMIR FLEXTOUCH 100 UNIT/ML injection pen, Inject 40 Units into the skin nightly , Disp: , Rfl: 2    metoprolol succinate (TOPROL XL) 100 MG extended release tablet, Take 100 mg by mouth daily , Disp: , Rfl:     atorvastatin (LIPITOR) 20 MG tablet, Take 20 mg by mouth daily , Disp: , Rfl: 3    famotidine (PEPCID) 40 MG tablet, Take 40 mg by mouth 2 times daily , Disp: , Rfl: 2    sertraline (ZOLOFT) 50 MG tablet, Take by mouth daily , Disp: , Rfl: 3    ferrous sulfate 325 (65 Fe) MG tablet, Take 325 mg by mouth daily (with breakfast) , Disp: , Rfl: 3    levothyroxine (SYNTHROID) 50 MCG tablet, Take 50 mcg by mouth Daily , Disp: , Rfl: 3    losartan (COZAAR) 25 MG tablet, Take 25 mg by mouth daily , Disp: , Rfl: 3     Allergies:    Erythromycin base and Gabapentin    Family History:  family history includes Cancer in her father and mother; Diabetes in her father and mother; Heart Defect in her mother; Hypertension in her mother; Hyperthyroidism in her mother.     Social History:   Social History     Occupational History    Not on file   Tobacco Use    Smoking status: Former Smoker     Packs/day: 0.50     Types: Cigarettes     Quit date: 2021     Years since quittin.0    Smokeless tobacco: Never Used   Vaping Use    Vaping Use: Never used   Substance and Sexual Activity    Alcohol use: Never    Drug use: Never    Sexual activity: Not on file     Occupation: Previously worked as a  full-time at a [employer], now working for home health     OBJECTIVE:  Temp 97.6 °F (36.4 °C)   Resp 12   Ht 5' 8\" (1.727 m)   Wt 300 lb (136.1 kg)   BMI 45.61 kg/m²    Psych: alert and oriented to person, time, and place  Cardio:  well perfused extremities  Resp:  normal respiratory effort  Skin:  no cyanosis  Hem/lymph:  no lymphedema  Neuro:  sensation to light touch grossly intact throughout all nerve distributions in the foot   Musculoskeletal:    RLE:  Vascular: Toes warm and well perfused, compartments soft/compressible, no significant swelling of foot. Skin: Intact without rash/lesions/AV malformations. Strength: Able to fire/perform the following with appropriate strength:    [x]  Tib Ant:     [x]  Gastroc-Soleus:         [x]  Inversion:      []  Eversion:  weak, painful       [x]  FHL:     [x]  EHL:      Motion:  Normal for the following joints:    [x]  Ankle:      [x]  Subtalar:       [x]  1st MTP:        []  1st TMT:            Tenderness to Palpation:    Tenderness to palpation:  along course of peroneal tendons  -Equinus      LLE:  Vascular: Toes warm and well perfused, compartments soft/compressible, no significant swelling of foot. Skin: Intact without rash/lesions/AV malformations. Strength: Able to fire/perform the following with appropriate strength:    [x]  Tib Ant:     [x]  Gastroc-Soleus:         [x]  Inversion:      []  Eversion:  weak, painful       [x]  FHL:     [x]  EHL:      Motion:  Normal for the following joints:    [x]  Ankle:      [x]  Subtalar:       [x]  1st MTP:        []  1st TMT:            Tenderness to Palpation:    Tenderness to palpation:  along course of peroneal tendons  -Equinus    Instability:   -Talar tilt: Negative  -Anterior drawer: Negative  -No peroneal subluxation/dislocation with dorsiflexion + eversion or with circumduction      RADIOLOGY:   7/23/2021 Prior images reviewed for reference. MRI images and radiology report reviewed, as below:    1. Mild-to-moderate tendinosis and partial split tearing of peroneus brevis. 2. Mild diffuse degenerative changes of the midfoot and TMT joints. 3. Mild edema in the subcutaneous fat about the ankle and dorsum of the foot. 4. Os trigonum. 5. Evidence of remote partial tearing of the medial deltoid ligament complex   and ATFL.         1. Mild degenerative changes as detailed above. 2. Mild diffuse edema throughout the intertarsal musculature and subcutaneous   fat of the left foot. 3. No acute osseous abnormality. 4. Please see MR left ankle without contrast report from 05/25/2021 for   additional details.                FINDINGS:  Three weightbearing views (AP, Mortise, and Lateral) of the bilateral ankle and three weightbearing views (AP, Oblique, Lateral) of the bilateral foot were obtained in the office today and reviewed, revealing no acute fracture, dislocation, or radioopaque foreign body/tumor. The ankle mortise is maintained with no widening of the clear spaces. Narrowed talocalcaneal angle worse on the left than on the right. IMPRESSION:  No acute fracture/dislocation. Electronically signed by Devin Buenrostro MD      LABS:   Lab Results   Component Value Date    LABA1C 6.8 (H) 07/21/2021     Lab Results   Component Value Date     07/21/2021         ASSESSMENT AND PLAN:  Body mass index is 45.61 kg/m². She has bilateral (left greater than right) peroneal tendinopathy, with underlying mild cavus radiographically and equinus. She also reports a history of left ankle instability (negative physical exam findings). Notably, she has a complex past medical history. She has a history of heart disease, insulin-dependent diabetes with neuropathy, history of CRPS type II (she is unable to clarify details surrounding this diagnosis), and tobacco use (reports she smokes 1/2 pack cigarettes per day, but reports that she quit on 7/12/2021 ). We had a discussion today about the likely diagnosis and its natural history, physical exam and imaging findings, as well as various treatment options in detail. Surgically, we have discussed a left peroneal tendon exploration with possible tenolysis/debridement/repair/tendon transfer.   We have discussed the anticipated postoperative course and relevant weightbearing restrictions/immobilization. She reports that she wishes to proceed with surgery in the near future. Due to her findings on physical exam and MRI, they correspond to her history, as well as the significance of her pain and the way that it is significantly impacting her life, I do believe that surgery does have the chance to offer her the most benefit in terms of pain. We did however discuss the numerous risk factors that she does have for postoperative complications that could provide her a compromise postsurgical outcome. We have discussed the risks of healing potential and delayed healing, as well as infection and DVT/pulmonary embolus in the setting of her tobacco use, as well as the history of insulin-dependent diabetes with neuropathy, and her history of CRPS. We did discuss that her pain could become worse as a result of surgery due to her CRPS, and she did express verbal understanding of this. The following outside medical records/clearances were reviewed and discussed at this visit:  Physical therapy DME eval, PCP Medical clearance, Cardio clearance and preop labs. Orders/referrals were placed as below at today's visit. We spoke about the risks/benefits/alternatives to a surgical intervention.  They understand that the risks of surgery may include but are not limited to pain, infection, bleeding, blood clot, damage to soft tissue/vessel/nerve, future surgery, scarring/stiffness, decreased strength/weakness, cosmetic deformity, neuroma/neuritis/phantom pains, delayed soft tissue/bone healing, nonunion, malunion, failure of hardware/fixation/surgery, iatrogenic fracture/dislocation, damage to bone/joint(s), worsening of condition, recurrence, limb length discrepancy, avascular necrosis of bone, neurovascular compromise/compartment syndrome, tourniquet complications, failure of surgery, dissatisfaction with outcome, loss of limb, stroke, heart attack, pulmonary embolus, mental status change, anesthesia risks/reaction, and even death. They expressed verbal understanding of the risks and wish to proceed with surgical intervention. All questions were answered. No guarantees were made or implied. Informed consent was obtained. We also had a discussion about the risk of blood clot and thromboembolic events. The patient understands that there is an increased risk with surgery/immobilization, and understands nothing will completely eliminate the risk of DVT/PE's, and that any prophylactic medication does not substitute for early mobilization. Given her risk profile, I have recommended the following strategy to decrease the risk of blood clots, and the patient agrees and wishes to proceed:  Lovenox 40 mg subQ q Day. All questions were answered and the patient agrees with the above plan. The patient will return to clinic postoperatively. At the patient's next visit, depending on how the patient is doing and/or new imaging/labs results, we may consider the following options:    []  Orthotic (OTC)     []  Orthotic (custom)          []  Rocker bottom shoes     []  Brace (OTC)        []  Brace (custom)             []  CAM boot        []  Night splint         []  Heel cups        []  Strap      []  Toe sleeves/splints    []  PT:                     []  Wean out of immobilization   []  Advance activity       []  Topical               []  NSAIDs          []  Evelio         []  Referral:         []  Stress xrays       []  CT         []  MRI        []  Injection:         []  Consider OR      []  Pick OR date    No follow-ups on file. No orders of the defined types were placed in this encounter. No orders of the defined types were placed in this encounter. Kimberly Moore MD  Orthopedic Surgery        Please excuse any typos/errors, as this note was created with the assistance of voice recognition software.  While intending to generate a document that actually reflects the content of the visit, the document can still have some errors including those of syntax and sound-a-like substitutions which may escape proof reading. In such instances, actual meaning can be extrapolated by context.

## 2021-08-05 ENCOUNTER — HOSPITAL ENCOUNTER (OUTPATIENT)
Age: 36
Setting detail: OUTPATIENT SURGERY
Discharge: HOME | End: 2021-08-05
Attending: ORTHOPAEDIC SURGERY | Admitting: ORTHOPAEDIC SURGERY
Payer: COMMERCIAL

## 2021-08-05 ENCOUNTER — ANESTHESIA (OUTPATIENT)
Dept: OPERATING ROOM | Age: 36
End: 2021-08-05
Payer: COMMERCIAL

## 2021-08-05 ENCOUNTER — ANESTHESIA EVENT (OUTPATIENT)
Dept: OPERATING ROOM | Age: 36
End: 2021-08-05
Payer: COMMERCIAL

## 2021-08-05 VITALS — TEMPERATURE: 96.6 F | SYSTOLIC BLOOD PRESSURE: 155 MMHG | DIASTOLIC BLOOD PRESSURE: 93 MMHG | OXYGEN SATURATION: 81 %

## 2021-08-05 VITALS
BODY MASS INDEX: 44.41 KG/M2 | DIASTOLIC BLOOD PRESSURE: 83 MMHG | RESPIRATION RATE: 14 BRPM | WEIGHT: 293 LBS | SYSTOLIC BLOOD PRESSURE: 121 MMHG | OXYGEN SATURATION: 93 % | HEIGHT: 68 IN | HEART RATE: 92 BPM | TEMPERATURE: 97.2 F

## 2021-08-05 DIAGNOSIS — M76.72 PERONEAL TENDINITIS OF LEFT LOWER EXTREMITY: Primary | ICD-10-CM

## 2021-08-05 LAB
GLUCOSE BLD-MCNC: 108 MG/DL (ref 65–105)
GLUCOSE BLD-MCNC: 140 MG/DL (ref 65–105)

## 2021-08-05 PROCEDURE — 7100000000 HC PACU RECOVERY - FIRST 15 MIN: Performed by: ORTHOPAEDIC SURGERY

## 2021-08-05 PROCEDURE — 6370000000 HC RX 637 (ALT 250 FOR IP): Performed by: ANESTHESIOLOGY

## 2021-08-05 PROCEDURE — 2709999900 HC NON-CHARGEABLE SUPPLY: Performed by: ORTHOPAEDIC SURGERY

## 2021-08-05 PROCEDURE — 3700000001 HC ADD 15 MINUTES (ANESTHESIA): Performed by: ORTHOPAEDIC SURGERY

## 2021-08-05 PROCEDURE — 6360000002 HC RX W HCPCS: Performed by: ANESTHESIOLOGY

## 2021-08-05 PROCEDURE — 27658 REPAIR OF LEG TENDON EACH: CPT | Performed by: ORTHOPAEDIC SURGERY

## 2021-08-05 PROCEDURE — 82947 ASSAY GLUCOSE BLOOD QUANT: CPT

## 2021-08-05 PROCEDURE — 27680 RELEASE OF LOWER LEG TENDON: CPT | Performed by: ORTHOPAEDIC SURGERY

## 2021-08-05 PROCEDURE — 7100000010 HC PHASE II RECOVERY - FIRST 15 MIN: Performed by: ORTHOPAEDIC SURGERY

## 2021-08-05 PROCEDURE — 2580000003 HC RX 258: Performed by: ANESTHESIOLOGY

## 2021-08-05 PROCEDURE — 2500000003 HC RX 250 WO HCPCS: Performed by: NURSE ANESTHETIST, CERTIFIED REGISTERED

## 2021-08-05 PROCEDURE — 7100000011 HC PHASE II RECOVERY - ADDTL 15 MIN: Performed by: ORTHOPAEDIC SURGERY

## 2021-08-05 PROCEDURE — 3600000002 HC SURGERY LEVEL 2 BASE: Performed by: ORTHOPAEDIC SURGERY

## 2021-08-05 PROCEDURE — 2580000003 HC RX 258: Performed by: NURSE ANESTHETIST, CERTIFIED REGISTERED

## 2021-08-05 PROCEDURE — 3600000012 HC SURGERY LEVEL 2 ADDTL 15MIN: Performed by: ORTHOPAEDIC SURGERY

## 2021-08-05 PROCEDURE — 3700000000 HC ANESTHESIA ATTENDED CARE: Performed by: ORTHOPAEDIC SURGERY

## 2021-08-05 PROCEDURE — 2580000003 HC RX 258: Performed by: ORTHOPAEDIC SURGERY

## 2021-08-05 PROCEDURE — 6360000002 HC RX W HCPCS: Performed by: NURSE ANESTHETIST, CERTIFIED REGISTERED

## 2021-08-05 PROCEDURE — 7100000001 HC PACU RECOVERY - ADDTL 15 MIN: Performed by: ORTHOPAEDIC SURGERY

## 2021-08-05 PROCEDURE — 6370000000 HC RX 637 (ALT 250 FOR IP): Performed by: ORTHOPAEDIC SURGERY

## 2021-08-05 PROCEDURE — 6360000002 HC RX W HCPCS: Performed by: ORTHOPAEDIC SURGERY

## 2021-08-05 RX ORDER — FENTANYL CITRATE 50 UG/ML
INJECTION, SOLUTION INTRAMUSCULAR; INTRAVENOUS PRN
Status: DISCONTINUED | OUTPATIENT
Start: 2021-08-05 | End: 2021-08-05 | Stop reason: SDUPTHER

## 2021-08-05 RX ORDER — ROCURONIUM BROMIDE 10 MG/ML
INJECTION, SOLUTION INTRAVENOUS PRN
Status: DISCONTINUED | OUTPATIENT
Start: 2021-08-05 | End: 2021-08-05 | Stop reason: SDUPTHER

## 2021-08-05 RX ORDER — ONDANSETRON 2 MG/ML
4 INJECTION INTRAMUSCULAR; INTRAVENOUS
Status: DISCONTINUED | OUTPATIENT
Start: 2021-08-05 | End: 2021-08-05 | Stop reason: HOSPADM

## 2021-08-05 RX ORDER — NEOSTIGMINE METHYLSULFATE 5 MG/5 ML
SYRINGE (ML) INTRAVENOUS PRN
Status: DISCONTINUED | OUTPATIENT
Start: 2021-08-05 | End: 2021-08-05 | Stop reason: SDUPTHER

## 2021-08-05 RX ORDER — DOCUSATE SODIUM 100 MG/1
100 CAPSULE, LIQUID FILLED ORAL 2 TIMES DAILY PRN
Qty: 20 CAPSULE | Refills: 0 | Status: SHIPPED | OUTPATIENT
Start: 2021-08-05 | End: 2021-08-12

## 2021-08-05 RX ORDER — ONDANSETRON 2 MG/ML
INJECTION INTRAMUSCULAR; INTRAVENOUS PRN
Status: DISCONTINUED | OUTPATIENT
Start: 2021-08-05 | End: 2021-08-05 | Stop reason: SDUPTHER

## 2021-08-05 RX ORDER — LIDOCAINE HYDROCHLORIDE 10 MG/ML
1 INJECTION, SOLUTION EPIDURAL; INFILTRATION; INTRACAUDAL; PERINEURAL
Status: DISCONTINUED | OUTPATIENT
Start: 2021-08-05 | End: 2021-08-05 | Stop reason: HOSPADM

## 2021-08-05 RX ORDER — ACETAMINOPHEN 500 MG
1000 TABLET ORAL ONCE
Status: COMPLETED | OUTPATIENT
Start: 2021-08-05 | End: 2021-08-05

## 2021-08-05 RX ORDER — LIDOCAINE HYDROCHLORIDE 20 MG/ML
INJECTION, SOLUTION EPIDURAL; INFILTRATION; INTRACAUDAL; PERINEURAL PRN
Status: DISCONTINUED | OUTPATIENT
Start: 2021-08-05 | End: 2021-08-05 | Stop reason: SDUPTHER

## 2021-08-05 RX ORDER — MIDAZOLAM HYDROCHLORIDE 1 MG/ML
INJECTION INTRAMUSCULAR; INTRAVENOUS PRN
Status: DISCONTINUED | OUTPATIENT
Start: 2021-08-05 | End: 2021-08-05 | Stop reason: SDUPTHER

## 2021-08-05 RX ORDER — SULFAMETHOXAZOLE AND TRIMETHOPRIM 800; 160 MG/1; MG/1
1 TABLET ORAL 2 TIMES DAILY
Qty: 10 TABLET | Refills: 0 | Status: SHIPPED | OUTPATIENT
Start: 2021-08-05 | End: 2021-08-10

## 2021-08-05 RX ORDER — SODIUM CHLORIDE 0.9 % (FLUSH) 0.9 %
10 SYRINGE (ML) INJECTION PRN
Status: DISCONTINUED | OUTPATIENT
Start: 2021-08-05 | End: 2021-08-05 | Stop reason: HOSPADM

## 2021-08-05 RX ORDER — SODIUM CHLORIDE 0.9 % (FLUSH) 0.9 %
10 SYRINGE (ML) INJECTION EVERY 12 HOURS SCHEDULED
Status: DISCONTINUED | OUTPATIENT
Start: 2021-08-05 | End: 2021-08-05 | Stop reason: HOSPADM

## 2021-08-05 RX ORDER — HYDROCODONE BITARTRATE AND ACETAMINOPHEN 5; 325 MG/1; MG/1
2 TABLET ORAL PRN
Status: COMPLETED | OUTPATIENT
Start: 2021-08-05 | End: 2021-08-05

## 2021-08-05 RX ORDER — FENTANYL CITRATE 50 UG/ML
25 INJECTION, SOLUTION INTRAMUSCULAR; INTRAVENOUS EVERY 5 MIN PRN
Status: DISCONTINUED | OUTPATIENT
Start: 2021-08-05 | End: 2021-08-05 | Stop reason: HOSPADM

## 2021-08-05 RX ORDER — GLYCOPYRROLATE 1 MG/5 ML
SYRINGE (ML) INTRAVENOUS PRN
Status: DISCONTINUED | OUTPATIENT
Start: 2021-08-05 | End: 2021-08-05 | Stop reason: SDUPTHER

## 2021-08-05 RX ORDER — DEXAMETHASONE SODIUM PHOSPHATE 10 MG/ML
INJECTION, SOLUTION INTRAMUSCULAR; INTRAVENOUS PRN
Status: DISCONTINUED | OUTPATIENT
Start: 2021-08-05 | End: 2021-08-05 | Stop reason: SDUPTHER

## 2021-08-05 RX ORDER — OXYCODONE HYDROCHLORIDE AND ACETAMINOPHEN 5; 325 MG/1; MG/1
1 TABLET ORAL EVERY 6 HOURS PRN
Qty: 20 TABLET | Refills: 0 | Status: SHIPPED | OUTPATIENT
Start: 2021-08-05 | End: 2021-08-05 | Stop reason: SDUPTHER

## 2021-08-05 RX ORDER — SODIUM CHLORIDE, SODIUM LACTATE, POTASSIUM CHLORIDE, CALCIUM CHLORIDE 600; 310; 30; 20 MG/100ML; MG/100ML; MG/100ML; MG/100ML
INJECTION, SOLUTION INTRAVENOUS CONTINUOUS
Status: DISCONTINUED | OUTPATIENT
Start: 2021-08-05 | End: 2021-08-05 | Stop reason: HOSPADM

## 2021-08-05 RX ORDER — ONDANSETRON 4 MG/1
4 TABLET, FILM COATED ORAL EVERY 8 HOURS PRN
Qty: 20 TABLET | Refills: 0 | Status: SHIPPED | OUTPATIENT
Start: 2021-08-05 | End: 2021-08-12

## 2021-08-05 RX ORDER — HYDROCODONE BITARTRATE AND ACETAMINOPHEN 5; 325 MG/1; MG/1
1 TABLET ORAL PRN
Status: COMPLETED | OUTPATIENT
Start: 2021-08-05 | End: 2021-08-05

## 2021-08-05 RX ORDER — PROPOFOL 10 MG/ML
INJECTION, EMULSION INTRAVENOUS PRN
Status: DISCONTINUED | OUTPATIENT
Start: 2021-08-05 | End: 2021-08-05 | Stop reason: SDUPTHER

## 2021-08-05 RX ORDER — GINSENG 100 MG
CAPSULE ORAL PRN
Status: DISCONTINUED | OUTPATIENT
Start: 2021-08-05 | End: 2021-08-05 | Stop reason: ALTCHOICE

## 2021-08-05 RX ORDER — SODIUM CHLORIDE 9 MG/ML
INJECTION, SOLUTION INTRAVENOUS CONTINUOUS
Status: DISCONTINUED | OUTPATIENT
Start: 2021-08-05 | End: 2021-08-05

## 2021-08-05 RX ORDER — SODIUM CHLORIDE 9 MG/ML
25 INJECTION, SOLUTION INTRAVENOUS PRN
Status: DISCONTINUED | OUTPATIENT
Start: 2021-08-05 | End: 2021-08-05 | Stop reason: HOSPADM

## 2021-08-05 RX ORDER — SODIUM CHLORIDE, SODIUM LACTATE, POTASSIUM CHLORIDE, CALCIUM CHLORIDE 600; 310; 30; 20 MG/100ML; MG/100ML; MG/100ML; MG/100ML
INJECTION, SOLUTION INTRAVENOUS CONTINUOUS PRN
Status: DISCONTINUED | OUTPATIENT
Start: 2021-08-05 | End: 2021-08-05 | Stop reason: SDUPTHER

## 2021-08-05 RX ORDER — FENTANYL CITRATE 50 UG/ML
50 INJECTION, SOLUTION INTRAMUSCULAR; INTRAVENOUS EVERY 5 MIN PRN
Status: DISCONTINUED | OUTPATIENT
Start: 2021-08-05 | End: 2021-08-05 | Stop reason: HOSPADM

## 2021-08-05 RX ORDER — OXYCODONE HYDROCHLORIDE AND ACETAMINOPHEN 5; 325 MG/1; MG/1
1 TABLET ORAL EVERY 6 HOURS PRN
Qty: 20 TABLET | Refills: 0 | Status: SHIPPED | OUTPATIENT
Start: 2021-08-05 | End: 2021-08-12

## 2021-08-05 RX ADMIN — FENTANYL CITRATE 25 MCG: 50 INJECTION, SOLUTION INTRAMUSCULAR; INTRAVENOUS at 11:21

## 2021-08-05 RX ADMIN — SODIUM CHLORIDE, POTASSIUM CHLORIDE, SODIUM LACTATE AND CALCIUM CHLORIDE: 600; 310; 30; 20 INJECTION, SOLUTION INTRAVENOUS at 06:15

## 2021-08-05 RX ADMIN — ONDANSETRON 4 MG: 2 INJECTION, SOLUTION INTRAMUSCULAR; INTRAVENOUS at 10:43

## 2021-08-05 RX ADMIN — DEXAMETHASONE SODIUM PHOSPHATE 10 MG: 10 INJECTION INTRAMUSCULAR; INTRAVENOUS at 09:45

## 2021-08-05 RX ADMIN — LIDOCAINE HYDROCHLORIDE 60 MG: 20 INJECTION, SOLUTION EPIDURAL; INFILTRATION; INTRACAUDAL; PERINEURAL at 09:38

## 2021-08-05 RX ADMIN — ACETAMINOPHEN 1000 MG: 500 TABLET ORAL at 07:05

## 2021-08-05 RX ADMIN — Medication 3 MG: at 10:49

## 2021-08-05 RX ADMIN — Medication 100 MCG: at 09:38

## 2021-08-05 RX ADMIN — ROCURONIUM BROMIDE 50 MG: 10 INJECTION, SOLUTION INTRAVENOUS at 09:38

## 2021-08-05 RX ADMIN — Medication 0.4 MG: at 10:49

## 2021-08-05 RX ADMIN — CEFAZOLIN SODIUM 3000 MG: 10 INJECTION, POWDER, FOR SOLUTION INTRAVENOUS at 09:47

## 2021-08-05 RX ADMIN — Medication 50 MCG: at 10:07

## 2021-08-05 RX ADMIN — Medication 50 MCG: at 10:59

## 2021-08-05 RX ADMIN — SODIUM CHLORIDE, POTASSIUM CHLORIDE, SODIUM LACTATE AND CALCIUM CHLORIDE: 600; 310; 30; 20 INJECTION, SOLUTION INTRAVENOUS at 09:32

## 2021-08-05 RX ADMIN — PROPOFOL 200 MG: 10 INJECTION, EMULSION INTRAVENOUS at 09:38

## 2021-08-05 RX ADMIN — FENTANYL CITRATE 50 MCG: 50 INJECTION, SOLUTION INTRAMUSCULAR; INTRAVENOUS at 11:10

## 2021-08-05 RX ADMIN — MIDAZOLAM 2 MG: 1 INJECTION INTRAMUSCULAR; INTRAVENOUS at 09:32

## 2021-08-05 RX ADMIN — HYDROCODONE BITARTRATE AND ACETAMINOPHEN 1 TABLET: 5; 325 TABLET ORAL at 12:04

## 2021-08-05 ASSESSMENT — PULMONARY FUNCTION TESTS
PIF_VALUE: 30
PIF_VALUE: 40
PIF_VALUE: 34
PIF_VALUE: 31
PIF_VALUE: 40
PIF_VALUE: 2
PIF_VALUE: 35
PIF_VALUE: 30
PIF_VALUE: 35
PIF_VALUE: 15
PIF_VALUE: 35
PIF_VALUE: 41
PIF_VALUE: 35
PIF_VALUE: 34
PIF_VALUE: 32
PIF_VALUE: 20
PIF_VALUE: 9
PIF_VALUE: 36
PIF_VALUE: 42
PIF_VALUE: 40
PIF_VALUE: 41
PIF_VALUE: 35
PIF_VALUE: 37
PIF_VALUE: 36
PIF_VALUE: 33
PIF_VALUE: 37
PIF_VALUE: 1
PIF_VALUE: 2
PIF_VALUE: 3
PIF_VALUE: 12
PIF_VALUE: 35
PIF_VALUE: 2
PIF_VALUE: 36
PIF_VALUE: 38
PIF_VALUE: 0
PIF_VALUE: 35
PIF_VALUE: 35
PIF_VALUE: 34
PIF_VALUE: 35
PIF_VALUE: 35
PIF_VALUE: 23
PIF_VALUE: 32
PIF_VALUE: 34
PIF_VALUE: 1
PIF_VALUE: 0
PIF_VALUE: 20
PIF_VALUE: 41
PIF_VALUE: 1
PIF_VALUE: 12
PIF_VALUE: 40
PIF_VALUE: 34
PIF_VALUE: 37
PIF_VALUE: 33
PIF_VALUE: 37
PIF_VALUE: 30
PIF_VALUE: 0
PIF_VALUE: 41
PIF_VALUE: 32
PIF_VALUE: 34
PIF_VALUE: 36
PIF_VALUE: 30
PIF_VALUE: 30
PIF_VALUE: 36
PIF_VALUE: 36
PIF_VALUE: 34
PIF_VALUE: 30
PIF_VALUE: 39
PIF_VALUE: 2
PIF_VALUE: 38
PIF_VALUE: 30
PIF_VALUE: 2
PIF_VALUE: 34
PIF_VALUE: 1
PIF_VALUE: 37
PIF_VALUE: 11
PIF_VALUE: 16
PIF_VALUE: 42
PIF_VALUE: 35
PIF_VALUE: 35
PIF_VALUE: 36
PIF_VALUE: 35
PIF_VALUE: 13
PIF_VALUE: 36
PIF_VALUE: 35
PIF_VALUE: 30
PIF_VALUE: 35
PIF_VALUE: 31

## 2021-08-05 ASSESSMENT — PAIN DESCRIPTION - PROGRESSION
CLINICAL_PROGRESSION: GRADUALLY WORSENING
CLINICAL_PROGRESSION: GRADUALLY IMPROVING
CLINICAL_PROGRESSION: NOT CHANGED
CLINICAL_PROGRESSION: GRADUALLY IMPROVING

## 2021-08-05 ASSESSMENT — PAIN DESCRIPTION - ONSET
ONSET: ON-GOING

## 2021-08-05 ASSESSMENT — PAIN DESCRIPTION - DESCRIPTORS
DESCRIPTORS: ACHING;SHARP
DESCRIPTORS: ACHING;BURNING;DULL;SHARP

## 2021-08-05 ASSESSMENT — PAIN DESCRIPTION - PAIN TYPE
TYPE: SURGICAL PAIN

## 2021-08-05 ASSESSMENT — PAIN DESCRIPTION - LOCATION
LOCATION: ANKLE

## 2021-08-05 ASSESSMENT — ENCOUNTER SYMPTOMS: SHORTNESS OF BREATH: 0

## 2021-08-05 ASSESSMENT — PAIN SCALES - GENERAL
PAINLEVEL_OUTOF10: 6
PAINLEVEL_OUTOF10: 7
PAINLEVEL_OUTOF10: 6
PAINLEVEL_OUTOF10: 8
PAINLEVEL_OUTOF10: 6
PAINLEVEL_OUTOF10: 4

## 2021-08-05 ASSESSMENT — PAIN DESCRIPTION - ORIENTATION
ORIENTATION: LEFT

## 2021-08-05 ASSESSMENT — PAIN DESCRIPTION - FREQUENCY
FREQUENCY: CONTINUOUS

## 2021-08-05 ASSESSMENT — PAIN - FUNCTIONAL ASSESSMENT: PAIN_FUNCTIONAL_ASSESSMENT: 0-10

## 2021-08-05 NOTE — PROGRESS NOTES
I spoke to the patient in the preoperative area, and the operative site was identified, verified and marked. The procedure was verified. We have reviewed the risks, benefits, and alternatives to the procedure. No guarantees were made. I have also reviewed the history and physical. There are no significant changes in medical history. All questions were answered and they wish to proceed as planned.      Electronically signed by Mickey Johnson MD

## 2021-08-05 NOTE — ANESTHESIA PRE PROCEDURE
Department of Anesthesiology  Preprocedure Note       Name:  Enzo Douglas   Age:  29 y.o.  :  1985                                          MRN:  [id number]         Date:  2021      Surgeon: Chele Brewsteror):  Jacques Abebe MD    Procedure: Procedure(s):  LEFT PERONEAL TENDON DEBRIDEMENT    Medications prior to admission:   Prior to Admission medications    Medication Sig Start Date End Date Taking?  Authorizing Provider   TRULICITY 1.5 FD/6.5WM SOPN Inject 1.5 mg into the skin once a week On wednesdays 3/29/21  Yes Historical Provider, MD   glimepiride (AMARYL) 4 MG tablet Take 4 mg by mouth 2 times daily  3/23/18  Yes Historical Provider, MD   metFORMIN (GLUCOPHAGE-XR) 750 MG extended release tablet Take 750 mg by mouth 2 times daily    Yes Historical Provider, MD   LEVEMIR FLEXTOUCH 100 UNIT/ML injection pen Inject 40 Units into the skin nightly  19  Yes Historical Provider, MD   metoprolol succinate (TOPROL XL) 100 MG extended release tablet Take 100 mg by mouth daily  17  Yes Historical Provider, MD   atorvastatin (LIPITOR) 20 MG tablet Take 20 mg by mouth daily  19  Yes Historical Provider, MD   famotidine (PEPCID) 40 MG tablet Take 40 mg by mouth 2 times daily  19  Yes Historical Provider, MD   sertraline (ZOLOFT) 50 MG tablet Take by mouth daily  19  Yes Historical Provider, MD   ferrous sulfate 325 (65 Fe) MG tablet Take 325 mg by mouth daily (with breakfast)  19  Yes Historical Provider, MD   levothyroxine (SYNTHROID) 50 MCG tablet Take 50 mcg by mouth Daily  19  Yes Historical Provider, MD   losartan (COZAAR) 25 MG tablet Take 25 mg by mouth daily  19  Yes Historical Provider, MD   furosemide (LASIX) 40 MG tablet Take 40 mg by mouth as needed For edema 3/30/21   Historical Provider, MD   ibuprofen (ADVIL;MOTRIN) 200 MG CAPS Take 2 tablets by mouth as needed    Historical Provider, MD   Ankle Foot Arthosis MISC by Does not apply route Use lateral flare to correct varus L side  Peroneal tendinitis of left lower extremity  (primary encounter diagnosis)    Left foot pain    Acquired heel varus of left foot 21   Cresencio Abbott DPM       Current medications:    Current Facility-Administered Medications   Medication Dose Route Frequency Provider Last Rate Last Admin    lactated ringers infusion   Intravenous Continuous Niki Jones  mL/hr at 21 0615 New Bag at 21 0615    sodium chloride flush 0.9 % injection 10 mL  10 mL Intravenous 2 times per day Niki Jones MD        sodium chloride flush 0.9 % injection 10 mL  10 mL Intravenous PRN Niki Jones MD        0.9 % sodium chloride infusion  25 mL Intravenous PRN Niki Jones MD        lidocaine PF 1 % injection 1 mL  1 mL Intradermal Once PRN Niki Jones MD        acetaminophen (TYLENOL) tablet 1,000 mg  1,000 mg Oral Once Isrrael Garcia DO        ceFAZolin (ANCEF) 3,000 mg in dextrose 5 % 100 mL IVPB  3,000 mg Intravenous Once Rubi Andujar MD           Allergies: Allergies   Allergen Reactions    Erythromycin Base Rash    Gabapentin Rash       Problem List:  There is no problem list on file for this patient.       Past Medical History:        Diagnosis Date    Depression     GERD (gastroesophageal reflux disease)     acid reflux    Hyperlipemia     Hypertension     Hypothyroid     Neuropathy     Tachycardia     Type 2 diabetes mellitus (HCC)        Past Surgical History:        Procedure Laterality Date    [facility]    ANKLE FRACTURE SURGERY      HYSTERECTOMY      LEEP         Social History:    Social History     Tobacco Use    Smoking status: Former Smoker     Packs/day: 0.50     Types: Cigarettes     Quit date: 2021     Years since quittin.0    Smokeless tobacco: Never Used   Substance Use Topics    Alcohol use: Never                                Counseling given: Not Answered      Vital Signs (Current):   Vitals:    08/05/21 0548 08/05/21 0557   BP: 136/85    Pulse: 95    Resp: 18    Temp: 97.1 °F (36.2 °C)    TempSrc: Temporal    SpO2: 95%    Weight:  300 lb (136.1 kg)   Height:  5' 8\" (1.727 m)                                              BP Readings from Last 3 Encounters:   08/05/21 136/85   07/21/21 137/84   04/06/21 138/80       NPO Status: Time of last liquid consumption: 2130                        Time of last solid consumption: 2130                        Date of last liquid consumption: 08/04/21                        Date of last solid food consumption: 08/04/21    BMI:   Wt Readings from Last 3 Encounters:   08/05/21 300 lb (136.1 kg)   07/21/21 300 lb (136.1 kg)   07/23/21 300 lb (136.1 kg)     Body mass index is 45.61 kg/m².     CBC:   Lab Results   Component Value Date    WBC 14.2 07/21/2021    RBC 5.56 07/21/2021    HGB 15.2 07/21/2021    HCT 47.3 07/21/2021    MCV 85.1 07/21/2021    RDW 14.8 07/21/2021     07/21/2021       CMP:   Lab Results   Component Value Date     07/21/2021    K 4.6 07/21/2021     07/21/2021    CO2 26 07/21/2021    BUN 11 07/21/2021    CREATININE 0.75 07/21/2021    GFRAA >60 07/21/2021    LABGLOM >60 07/21/2021    GLUCOSE 149 07/21/2021    PROT 7.8 12/29/2020    CALCIUM 9.4 07/21/2021    BILITOT 0.21 12/29/2020    ALKPHOS 144 12/29/2020    AST 16 12/29/2020    ALT 15 12/29/2020       POC Tests:   Recent Labs     08/05/21  0612   POCGLU 140*       Coags: No results found for: PROTIME, INR, APTT    HCG (If Applicable): No results found for: PREGTESTUR, PREGSERUM, HCG, HCGQUANT     ABGs: No results found for: PHART, PO2ART, ORO9UIS, MOC9SEU, BEART, G6CXHBMV     Type & Screen (If Applicable):  No results found for: LABABO, LABRH    Drug/Infectious Status (If Applicable):  No results found for: HIV, HEPCAB    COVID-19 Screening (If Applicable): No results found for: COVID19        Anesthesia Evaluation    Airway: Mallampati: I  TM distance: >3 FB Neck ROM: full  Mouth opening: > = 3 FB Dental:          Pulmonary:       (-) shortness of breath                           Cardiovascular:    (+) hypertension:,     (-)  angina                Neuro/Psych:               GI/Hepatic/Renal:             Endo/Other:    (+) Diabetes, . Abdominal:             Vascular:           Other Findings:             Anesthesia Plan      general     ASA 3                         MO        Dwayne Arroyo MD   8/5/2021

## 2021-08-05 NOTE — OP NOTE
[address] [address]  Dept: [phone]  Loc: [phone]      Orthopedic Surgery Operative Report      Patient: Shanna Matthew      MRN#: [id number]     YOB: 1985      Date of Admission: 8/5/2021    Attending Surgeon: Magalis Pickering M.D.   PCP: CLARK Swartz - MARTINEZ        Preoperative Diagnosis:   1. Left peroneus brevis tear, tendinopathy and scar tissue  2. 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 Insulin-dependent diabetes with neuropathy  4. History of tobacco use  5. History of CRPS type II  6. History of heart disease  7. Body mass index is 45.61 kg/m². Postoperative Diagnosis:   1. Same as above    Procedures Performed:  (8/5/2021)  1. Left peroneus brevis repair, debridement and tenolysis  2. Left peroneus longus debridement and tenolysis      Implants:    * No implants in log *      Attending Surgeon:     Tita Mckay MD      Assistant Surgeon:    Resident: Nely Peres DO      Anesthesia:    General      Staff:  Surgeon(s):  Brandon Melo MD  Scrub Person First: Guanaco Henley  Anesthesia: Zayra Jaimes MD      Estimated Blood Loss:    Minimal      Complications:    None      Specimen:    * No specimens in log *      History:    Ms. Shanna Matthew is a 29 y.o. female who was seen recently for the above problem. She has bilateral (left greater than right) peroneal tendinopathy, with underlying mild cavus radiographically and equinus. She also reports a history of left ankle instability (negative physical exam findings).     Notably, she has a complex past medical history. She has a history of heart disease, insulin-dependent diabetes with neuropathy, history of CRPS type II (she is unable to clarify details surrounding this diagnosis), and tobacco use (reports she smokes 1/2 pack cigarettes per day, but reports that she quit on 7/12/2021 ).      She is currently medically stable and appropriate for the planned procedure. We have spoken about the risks/benefits/alternatives to a surgical intervention, verbal understanding of these risks was expressed, and informed consent was obtained. All questions were answered. No guarantees were made or implied. I have answered all questions, and they wish to proceed with surgical intervention. Operative Note:    The patient was identified in the preoperative holding area by name, MRN, and . The surgical site was verified and marked according to AAOS guidelines. The patient was taken to the operating room and placed on the operating table in a supine position. After achieving adequate sedation by the anesthesia team, the patient was then carefully positioned and all bony prominences were well padded. A tourniquet was placed on the ipsilateral thigh, and then the operative extremity was prepped and draped in the usual sterile fashion. A timeout was held in which the patient's identity, surgical site, procedure, allergies, and antibiotics were verified, and all in the room were in agreement, and thus the procedure began. The leg was exsanguinated to the level of the tourniquet, and the tourniquet was then elevated to 300 mm Hg. The total tourniquet time was 40 minutes. A curvilinear incision was marked out about 10 cm in length over the posterior edge of the distal fibula, extending distal to the tip of the fibula, utilizing the prior incision. The skin was incised sharply and vessels were cauterized. Multiple small branches of the sural nerve for carefully dissected out and protected throughout the case. Careful dissection was then performed to expose and identify the peroneal sheath. The sheath was sharply incised in line with the incision, leaving a small cuff of tissue posteriorly at the fibular ridge for later repair, while protecting the tendons.      There was noted to be a tremendous amount of scar tissue diffusely encasing the peroneus brevis, and the peroneus brevis was noted to be locked in place, and did not have any excursion. Extensive debridement was performed of the peroneus brevis scar tissue. The peroneus brevis was noted to have a longitudinal split tear with significant degeneration, and was noted to be a complex tear, extending for approximately 4 cm in length. Sharp debridement using a scalpel and Metzenbaum scissors were used to debride the area of tendinopathy, complex tear (as it was noted to be not amenable to repair due the pattern and tissue quality, low lying muscle belly back to several cm's proximal to the tip of the lateral malleolus and scar tissue and synovitic tissue adherent to the tendon, to free it up appropriately to allow appropriate gliding and excursion. A large area of tendinosis was also debrided, and there was also noted to be intrasubstance calcific degeneration of the tendon, and this calcium was excisionally debrided. There was also a low-lying muscle belly on the peroneus brevis, which was also excisionally debrided and extricated from the wound. Approximately <20% of the tendon wound up needing to be excised, leaving only healthy viable tendon behind. After the debridement on of the tendon substance was deemed satisfactory, the tendon was then tubularized using running 3-0 monocryl. The peroneal tendon was noted to be able to glide smoothly without catching/tethering/bunching. The peroneus longus was noted to have no tear or tendinotic degeneration, but did have synovitis/scar tissue encasing the tendon. A tenolysis was performed to free up the peroneus longus to allow appropriate excursion. Sharp debridement using a scalpel and Metzenbaum scissors were used to debride the adherent scar tissue until the tendon was able to glide freely. The peroneus longus appeared healthy and was not adherent anymore.  The peroneal tendon was noted to be able to glide smoothly without catching/tethering/bunching. Copious sterile irrigation was used to rinse the operative site. The peroneal retinaculum was then repaired with #0 Vicryl, taking care not to bind the peroneals or over tighten the repair. The peroneals were able to glide smoothly without tethering/binding/bunching, and appropriate excursion was noted. A layered closure was performed using 2-0 vicryl and 3-0 nylon, providing appropriate tension to the skin. The skin was cleaned and gently dried. Steri-Strips were then applied, and anti-bacterial ointment was applied on top of that, with Adaptic and fluffs. A sterile layer of cast padding was then applied. A short leg splint with the ankle at neutral was applied. The patient was aroused from anesthesia without complication, and gently transferred to the bed and then transported to the postoperative area in a stable condition. The patient was noted to have tolerated the procedure well without complication. Postoperative Plan:         Precautions:  nonweight bearing x 6 weeks anticipated on the Left lower extremity   -             []  Physical Therapy/Home exercises       Immobilization:      [x]  Splint/Cast  []  CAM boot  []  Comfortable shoe    []  Other:      DVT ppx:   [x]  Early mobilization       [x]  Medication as prescribed (Lovenox)      []  No chemical ppx needed; mechanical only   -    Pain control:  Medication as prescribed (dosing and quantity) indicated for acute postoperative pain control   -    Special concerns:       [x]  Glucose control            [x]  Tobacco cessation         [x]  Avoid strenuous activity/pain provoking maneuvers and high-impact repetitive exercises    -    Disposition:   PACU      The patient has been instructed to follow up in the office. The particulars of surgery as well as the condition of the patient postoperatively were discussed with the patients family thereafter per the patient's wishes.            Johnny Islas MD  Orthopedic Surgery

## 2021-08-05 NOTE — ANESTHESIA POSTPROCEDURE EVALUATION
Department of Anesthesiology  Postprocedure Note    Patient: Tony Hu  MRN: [id number]  YOB: 1985  Date of evaluation: 8/5/2021  Time:  3:06 PM     Procedure Summary     Date: 08/05/21 Room / Location: [address] [place] [address]    Anesthesia Start: 0932 Anesthesia Stop: 1104    Procedure: LEFT PERONEAL TENDON DEBRIDEMENT (Left ) Diagnosis: (DX LEFT PERONEAL TENDINOPATHY)    Surgeons: Elmer Paula MD Responsible Provider: Maribel Taylor MD    Anesthesia Type: general ASA Status: 3          Anesthesia Type: general    Mayra Phase I: Mayra Score: 10    Mayra Phase II: Mayra Score: 10    Last vitals: Reviewed and per EMR flowsheets.        Anesthesia Post Evaluation    Complications: no

## 2021-08-05 NOTE — INTERVAL H&P NOTE
Historical Provider, MD   ferrous sulfate 325 (65 Fe) MG tablet Take 325 mg by mouth daily (with breakfast)  5/16/19  Yes Historical Provider, MD   levothyroxine (SYNTHROID) 50 MCG tablet Take 50 mcg by mouth Daily  5/30/19  Yes Historical Provider, MD   losartan (COZAAR) 25 MG tablet Take 25 mg by mouth daily  6/20/19  Yes Historical Provider, MD   furosemide (LASIX) 40 MG tablet Take 40 mg by mouth as needed For edema 3/30/21   Historical Provider, MD   ibuprofen (ADVIL;MOTRIN) 200 MG CAPS Take 2 tablets by mouth as needed    Historical Provider, MD   Ankle Foot Arthosis MISC by Does not apply route Use lateral flare to correct varus L side  Peroneal tendinitis of left lower extremity  (primary encounter diagnosis)    Left foot pain    Acquired heel varus of left foot 4/6/21   Fariha Mae, DPDIASHA         This is a 29 y. o.morbidly obese female who is pleasant, cooperative, alert and oriented x3, in no acute distress. Heart: Heart sounds are normal.  HR 95 regular rate and rhythm without murmur, gallop or rub. Lungs: Normal respiratory effort with equal expansion, good air exchange, unlabored and clear to auscultation without wheezes or rales bilaterally   Abdomen: soft, obese with fold, nontender, nondistended with bowel sounds . Skin:  Closed nondraining psoriatic lesions left foot        Labs:  Recent Labs     07/21/21  1318   HGB 15.2*   HCT 47.3*   WBC 14.2*   MCV 85.1         K 4.6      CO2 26   BUN 11   CREATININE 0.75   GLUCOSE 149*       No results for input(s): COVID19 in the last 720 hours.     CLARK Renee CNP   Electronically signed 8/5/2021 at 6:09 AM

## 2021-08-06 ENCOUNTER — CLINICAL DOCUMENTATION (OUTPATIENT)
Dept: ORTHOPEDIC SURGERY | Age: 36
End: 2021-08-06

## 2021-08-24 ENCOUNTER — OFFICE VISIT (OUTPATIENT)
Dept: ORTHOPEDIC SURGERY | Age: 36
End: 2021-08-24

## 2021-08-24 VITALS — WEIGHT: 293 LBS | RESPIRATION RATE: 12 BRPM | HEIGHT: 68 IN | BODY MASS INDEX: 44.41 KG/M2 | TEMPERATURE: 98 F

## 2021-08-24 DIAGNOSIS — M76.72 PERONEAL TENDINITIS OF LEFT LOWER EXTREMITY: Primary | ICD-10-CM

## 2021-08-24 PROCEDURE — 99024 POSTOP FOLLOW-UP VISIT: CPT | Performed by: ORTHOPAEDIC SURGERY

## 2021-08-24 NOTE — PROGRESS NOTES
[facility]  [address] [address]  Dept: [phone]    Ambulatory Orthopedic Postoperative Visit     Preoperative Diagnosis:   1. Left peroneus brevis tear, tendinopathy and scar tissue  2. 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 Insulin-dependent diabetes with neuropathy  4. History of tobacco use  5. History of CRPS type II  6. History of heart disease  7. Body mass index is 45.61 kg/m².     Postoperative Diagnosis:   1. Same as above     Procedures Performed:  (8/5/2021)  1. Left peroneus brevis repair, debridement and tenolysis  2. Left peroneus longus debridement and tenolysis      SUBJECTIVE:     The patient returns post op from the above stated procedure. Reports doing well overall, reports improved pain, denies wound drainage/issues, fevers/chills/night sweats, calf swelling/pain, chest pain, shortness of breath. OBJECTIVE:  Temp 98 °F (36.7 °C)   Resp 12   Ht 5' 8\" (1.727 m)   Wt 300 lb (136.1 kg)   BMI 45.61 kg/m²    NAD, resting comfortably  Incisions clean/dry/intact, no erythema/dehiscence/drainage  Sensation to light touch grossly intact throughout  Warm and well perfused  Grossly neurovascularly intact distally  No signs of infection  No calf swelling/tenderness      RADIOLOGY:   8/24/2021 No new radiology images today. Prior images reviewed for reference.       ASSESSMENT AND PLAN:     2.5 weeks s/p above, doing well overall      She has a history of bilateral (left greater than right) peroneal tendinopathy, with underlying mild cavus radiographically and equinus.  She also reports a history of left ankle instability (negative physical exam findings).     Notably, she has a complex past medical history.  She has a history of heart disease, insulin-dependent diabetes with neuropathy, history of CRPS type II (she is unable to clarify details surrounding this diagnosis), and tobacco use (reports she smokes 1/2 pack cigarettes per day, but reports that she quit on 7/12/2021 ). [x]  Sutures/staples were removed and steri-strips applied          Precautions:  nonweight bearing x 6 weeks anticipated on the Left lower extremity             -             [x]? Physical Therapy/Home exercises    --okay to begin dorsiflexion/plantarflexion, no active eversion, may begin active inversion     Immobilization:      []? Splint/Cast               [x]? CAM boot                    []?  Comfortable shoe    []? Other:                DVT ppx:   [x]? Early mobilization              [x]? Medication as prescribed (Lovenox)                    []?  No chemical ppx needed; mechanical only             -     Pain control:  Medication as prescribed (dosing and quantity) indicated for acute postoperative pain control             -     Special concerns:       [x]? Glucose control            [x]? Tobacco cessation           [x]? Avoid strenuous activity/pain provoking maneuvers and high-impact repetitive exercises     -        All questions were answered and the patient agrees with the above plan. The patient will return to clinic in 4 weeks without x-rays         No follow-ups on file. No orders of the defined types were placed in this encounter. No orders of the defined types were placed in this encounter. Deedee Gomez MD  Orthopedic Surgery        Please excuse any typos/errors, as this note was created with the assistance of voice recognition software. While intending to generate a document that actually reflects the content of the visit, the document can still have some errors including those of syntax and sound-a-like substitutions which may escape proof reading. In such instances, actual meaning can be extrapolated by context.

## 2021-08-30 ENCOUNTER — HOSPITAL ENCOUNTER (OUTPATIENT)
Dept: PHYSICAL THERAPY | Age: 36
Setting detail: THERAPIES SERIES
Discharge: HOME | End: 2021-08-30
Payer: COMMERCIAL

## 2021-08-30 PROCEDURE — 97161 PT EVAL LOW COMPLEX 20 MIN: CPT | Performed by: PHYSICAL THERAPIST

## 2021-08-30 PROCEDURE — 97110 THERAPEUTIC EXERCISES: CPT | Performed by: PHYSICAL THERAPIST

## 2021-08-30 ASSESSMENT — PAIN DESCRIPTION - ORIENTATION: ORIENTATION: LEFT

## 2021-08-30 ASSESSMENT — PAIN SCALES - GENERAL: PAINLEVEL_OUTOF10: 2

## 2021-08-30 ASSESSMENT — PAIN DESCRIPTION - LOCATION: LOCATION: FOOT;ANKLE

## 2021-08-30 ASSESSMENT — PAIN DESCRIPTION - DESCRIPTORS: DESCRIPTORS: ACHING

## 2021-08-30 ASSESSMENT — PAIN DESCRIPTION - PROGRESSION: CLINICAL_PROGRESSION: GRADUALLY IMPROVING

## 2021-08-30 ASSESSMENT — PAIN DESCRIPTION - ONSET: ONSET: PROGRESSIVE

## 2021-08-30 ASSESSMENT — PAIN DESCRIPTION - FREQUENCY: FREQUENCY: INTERMITTENT

## 2021-08-30 ASSESSMENT — PAIN DESCRIPTION - PAIN TYPE: TYPE: SURGICAL PAIN

## 2021-08-30 NOTE — FLOWSHEET NOTE
Physical Therapy Daily Treatment Note    Date:  2021    Patient Name:  Troy Ng    :  1985  MRN: [id number]  Restrictions/Precautions:NWB in boot 6 weeks                                               PWB 25 % x2 weeks                                               PWB 50% x2 weeks                                               PWB  75% x 2 weeks                                               FWB in boot x 2 weeks    Medical/Treatment Diagnosis Information:   · Diagnosis: M76.72 L peroneal tendinitis, s/p peroneus brevis repair, debridement, tenolysis, peroneus longus debridement, tenolysis  · Treatment Diagnosis: S/P L peroneus brevis repair, debridement, tenolysis, peroneus longus debridement, tenolysis 66  Insurance/Certification information:  PT Insurance Information: [facility]  Physician Information:  Referring Practitioner: Kike Sánchez of [facility] signed (Y/N):n    Visit# / total visits:1  /10  Pain level: 2/10       Time In:10:00   Time Out:10:46    Progress Note: [x]  Yes  []  No  Next due by: Visit #10, or 21      Subjective: See eval      Objective: See eval  Observation:   Test measurements:      Exercises:   Exercise/Equipment Resistance/Repetitions Other comments   AROM dflex, pflex, inversion 10x No eversion    Gastro stretch knee extended 30\" x3 With strap   Achilles stretch, knee bent 30\", x3 With strap        Review NWB all time          Gait RW NWB L                                         [x] Provided verbal/tactile cueing for activities related to strengthening, flexibility, endurance, ROM. ([id number])  [] Provided verbal/tactile cueing for activities related to improving balance, coordination, kinesthetic sense, posture, motor skill, proprioception. (69492)    Therapeutic Activities:     [] Therapeutic activities, direct (one-on-one) patient contact (use of dynamic activities to improve functional performance).  (24059)    Gait:   [] Provided training and instruction to the patient for ambulation re-education. (83126)    Self-Care/ADL's  [] Self-care/home management training and compensatory training, meal preparation, safety procedures, and instructions in use of assistive technology devices/adaptive equipment, direct one-on-one contact. (93969)    Home Exercise Program:  AROM dflex, pflex, inversion. Strap gastroc stretch & achilles stretch   [x] Reviewed/Progressed HEP activities related to strengthening, flexibility, endurance, ROM. (94978)  [] Reviewed/Progressed HEP activities related to improving balance, coordination, kinesthetic sense, posture, motor skill, proprioception.  (13983)    Manual Treatments:    [] Provided manual therapy to mobilize soft tissue/joints for the purpose of modulating pain, promoting relaxation,  increasing ROM, reducing/eliminating soft tissue swelling/inflammation/restriction, improving soft tissue extensibility. (79982)    Service Based Modalities:  Eval 31'    Timed Code Treatment Minutes:    There ex/HEP 15'    Total Treatment Minutes: 55'      Treatment/Activity Tolerance:  [x] Patient tolerated treatment well [] Patient limited by fatique  [] Patient limited by pain  [] Patient limited by other medical complications  [] Other:     Prognosis: [x] Good [] Fair  [] Poor    Patient Requires Follow-up: [x] Yes  [] No      Goals:  Short term goals  Time Frame for Short term goals: 1 week  Short term goal 1: Start HEP  Short term goal 2: Review NWB plan    Long term goals  Time Frame for Long term goals : 12 weeks  Long term goal 1: Go thru weight bearing protocol progression to eventual FWB in a shoe  Long term goal 2: Gait with no device for 30-40 min for return to community activity  Long term goal 3: Up/ down 6-8\" steps for independent home activity  Long term goal 4: Able to stand for 30-40 min for return to home tasks          Plan:   [] Continue per plan of care [] Alter current plan (see comments)  [x] Plan of care initiated [] Hold pending MD visit []

## 2021-08-30 NOTE — PROGRESS NOTES
Physical Therapy  Initial Assessment  Date: 2021  Patient Name: Becki San  MRN: [id number]  : 1985     Treatment Diagnosis: S/P L peroneus brevis repair, debridement, tenolysis, peroneus longus debridement, tenolysis 21    Restrictions  Restrictions/Precautions  Restrictions/Precautions: Weight Bearing  Lower Extremity Weight Bearing Restrictions  Left Lower Extremity Weight Bearing: Non Weight Bearing    Subjective   General  Chart Reviewed: Yes  Patient assessed for rehabilitation services?: Yes  Response To Previous Treatment: Patient with no complaints from previous session. Family / Caregiver Present: Yes  Referring Practitioner: Bertrand  Referral Date : 21  Diagnosis: M76.72 L peroneal tendinitis, s/p peroneus brevis repair, debridement, tenolysis, peroneus longus debridement, tenolysis  Follows Commands: Within Functional Limits  PT Visit Information  Onset Date: 21  PT Insurance Information: BCBS  Subjective  Subjective: Had L ankle surgery 21. Has been in the boot, no weight on L LE, using scooter. Pain Screening  Patient Currently in Pain: Yes  Pain Assessment  Pain Assessment: 0-10  Pain Level: 2  Pain Type: Surgical pain  Pain Location: Foot; Ankle  Pain Orientation: Left  Pain Descriptors: Aching  Pain Frequency: Intermittent  Pain Onset: Progressive  Clinical Progression: Gradually improving  Vital Signs  Patient Currently in Pain: Yes    Vision/Hearing  Vision  Vision: Within Functional Limits  Hearing  Hearing: Within functional limits    Orientation  Orientation  Overall Orientation Status: Within Normal Limits    Social/Functional History  Social/Functional History  Lives With: Family  Type of Home: House  Home Layout: One level  Home Access: Stairs to enter without rails  Entrance Stairs - Number of Steps: 2  Home Equipment: Rolling walker;Crutches  Receives Help From: Family  ADL Assistance: Independent  Homemaking Assistance: Needs assistance  Ambulation Assistance: Needs assistance  Transfer Assistance: Independent (staying NWB)  Active : No  Occupation: Unemployed  Type of occupation: Unemployed  IADL Comments: Primarily using scooter, Uses walker with spouse assistance on steps, NWB    Objective     Observation/Palpation  Observation: In boot, on scooter  Scar: Steri strips in place. Wound at lateral L 5th toe    PROM LLE (degrees)  L Ankle Dorsiflexion 0-20: -15  L Ankle Plantar Flexion 0-45: 32  L Ankle Forefoot Inversion 0-40: 15  L Ankle Forefoot Eversion 0-20: not tested  AROM LLE (degrees)  L Ankle Dorsiflexion 0-20: -20 to neutral  L Ankle Plantar Flexion 0-45: 30  L Ankle Forefoot Inversion 0-40: 15  L Ankle Forefoot Eversion 0-20: not tested    Strength LLE  L Ankle Dorsiflexion: 2+/5  L Ankle Plantar Flexion: 3-/5  L Ankle Inversion: 3-/5  L Ankle Eversion:  (not tested)      Transfers  Sit to Stand: Modified independent (keeping boot NWB)  Stand Pivot Transfers: Modified independent (stays NWB)       Assessment   Conditions Requiring Skilled Therapeutic Intervention  Body structures, Functions, Activity limitations: Decreased functional mobility ; Decreased ROM; Decreased strength;Decreased endurance  Treatment Diagnosis: S/P L peroneus brevis repair, debridement, tenolysis, peroneus longus debridement, tenolysis 8/5/21  Prognosis: Good  Decision Making: Low Complexity  REQUIRES PT FOLLOW UP: Yes  Activity Tolerance  Activity Tolerance: Patient Tolerated treatment well         Plan   Plan  Times per week: 2  Plan weeks: 12  Current Treatment Recommendations: Strengthening, ROM, Home Exercise Program, Gait Training, Transfer Training    OutComes Score   LEFS 15/80 = 19%, or 81% disability           Goals  Short term goals  Time Frame for Short term goals: 1 week  Short term goal 1: Start HEP  Short term goal 2: Review NWB plan  Long term goals  Time Frame for Long term goals : 12 weeks  Long term goal 1: Go thru weight bearing protocol progression to eventual FWB in a shoe  Long term goal 2: Gait with no device for 30-40 min for return to community activity  Long term goal 3: Up/ down 6-8\" steps for independent home activity  Long term goal 4: Able to stand for 30-40 min for return to home tasks       Therapy Time   Individual Concurrent Group Co-treatment   Time In 1000         Time Out 1045         Minutes 45                 Cherrie López, [place]

## 2021-08-30 NOTE — PLAN OF CARE
[facility]    [x] [place]  Phone: [phone]  Fax: [phone]      [] [place]  Phone: [phone]  Fax: [phone]        To: Referring Practitioner: Ahsanti Stratton      Patient: Tony Hu  : 1985   MRN: [id number]  Evaluation Date: 2021      Diagnosis Information:  · Diagnosis: M76.72 L peroneal tendinitis, s/p peroneus brevis repair, debridement, tenolysis, peroneus longus debridement, tenolysis   · Treatment Diagnosis: S/P L peroneus brevis repair, debridement, tenolysis, peroneus longus debridement, tenolysis 21     Physical Therapy Certification Form  Dear Mayra Lang  The following patient has been evaluated for physical therapy services and for therapy to continue, Medicare requires monthly physician review of the treatment plan. Please review the attached evaluation and/or summary of the patient's plan of care, and verify that you agree therapy should continue by signing the attached document and sending it back to our office.     Plan of Care/Treatment to date:  [x] Therapeutic Exercise    [] Modalities:  [x] Therapeutic Activity     [] Ultrasound  [x] Electrical Stimulation  [x] Gait Training      [] Cervical Traction [] Lumbar Traction  [] Neuromuscular Re-education    [] Cold/hotpack [] Iontophoresis   [x] Instruction in HEP     Other:  [x] Manual Therapy      []             [] Aquatic Therapy      []                 Goals:  Short term goals  Time Frame for Short term goals: 1 week  Short term goal 1: Start HEP  Short term goal 2: Review NWB plan    Long term goals  Time Frame for Long term goals : 12 weeks  Long term goal 1: Go thru weight bearing protocol progression to eventual FWB in a shoe  Long term goal 2: Gait with no device for 30-40 min for return to community activity  Long term goal 3: Up/ down 6-8\" steps for independent home activity  Long term goal 4: Able to stand for 30-40 min for return to home tasks    Frequency/Duration: 8/30/21 - 9/29/21  # Days per week: [] 1 day # Weeks: [] 1 week [] 5 weeks     [x] 2 days   [] 2 weeks [] 6 weeks     [] 3 days   [] 3 weeks [] 7 weeks     [] 4 days   [x] 4 weeks [] 8 weeks    Rehab Potential: [] Excellent [x] Good [] Fair  [] Poor     Electronically signed by:  Miriam Roblero PT      If you have any questions or concerns, please don't hesitate to call.   Thank you for your referral.      Physician Signature:________________________________Date:__________________  By signing above, therapists plan is approved by physician

## 2021-09-03 ENCOUNTER — HOSPITAL ENCOUNTER (OUTPATIENT)
Dept: PHYSICAL THERAPY | Age: 36
Setting detail: THERAPIES SERIES
Discharge: HOME | End: 2021-09-03
Payer: COMMERCIAL

## 2021-09-03 PROCEDURE — 97110 THERAPEUTIC EXERCISES: CPT

## 2021-09-03 NOTE — FLOWSHEET NOTE
Physical Therapy Daily Treatment Note    Date:  9/3/2021    Patient Name:  Becki San    :  1985  MRN: [id number]  Restrictions/Precautions:NWB in boot 6 weeks                                               PWB 25 % x2 weeks                                               PWB 50% x2 weeks                                               PWB  75% x 2 weeks                                               FWB in boot x 2 weeks    Medical/Treatment Diagnosis Information:   · Diagnosis: M76.72 L peroneal tendinitis, s/p peroneus brevis repair, debridement, tenolysis, peroneus longus debridement, tenolysis  · Treatment Diagnosis: S/P L peroneus brevis repair, debridement, tenolysis, peroneus longus debridement, tenolysis 3/9/27  Insurance/Certification information:  PT Insurance Information: [facility]  Physician Information:  Referring Practitioner: Palak Blackman of care signed (Y/N): y    Visit# / total visits: 2  /10  Pain level: 0/10       Time In: 1:02   Time Out:  1:28    Progress Note: []  Yes  [x]  No  Next due by: Visit #10, or 21      Subjective: Patient states ankle has been feeling okay. States feeling muscle are getting stronger in ankle with exercises. Pt states originally was falling more. Objective: CLARENCE performed per flow sheet for improved mobility and strengthening. Verbal cueing for sequencing and technique of exercise. Reviewed stairs and ambulating with walker. Pt declined gait with RW as aggravated R hip.      Observation:   Test measurements:      Exercises:   Exercise/Equipment Resistance/Repetitions Other comments   AROM dflex, pflex, inversion 15x (ADV) No eversion    Gastro stretch knee extended 30\" x3 With strap   Achilles stretch, knee bent 30\", x3 With strap        Review NWB all time Reviewed         Gait RW NWB L Uses scooter 1 lap         Toe Curls/ext 10x Initiated                              [x] Provided verbal/tactile cueing for activities related to strengthening, flexibility, endurance, ROM. (83627)  [] Provided verbal/tactile cueing for activities related to improving balance, coordination, kinesthetic sense, posture, motor skill, proprioception. (67274)    Therapeutic Activities:     [] Therapeutic activities, direct (one-on-one) patient contact (use of dynamic activities to improve functional performance). (41955)    Gait:   [] Provided training and instruction to the patient for ambulation re-education. (61015)    Self-Care/ADL's  [] Self-care/home management training and compensatory training, meal preparation, safety procedures, and instructions in use of assistive technology devices/adaptive equipment, direct one-on-one contact. (47275)    Home Exercise Program:  AROM dflex, pflex, inversion. Strap gastroc stretch & achilles stretch   [x] Reviewed/Progressed HEP activities related to strengthening, flexibility, endurance, ROM. (95155)  [] Reviewed/Progressed HEP activities related to improving balance, coordination, kinesthetic sense, posture, motor skill, proprioception.  (57644)    Manual Treatments:    [] Provided manual therapy to mobilize soft tissue/joints for the purpose of modulating pain, promoting relaxation,  increasing ROM, reducing/eliminating soft tissue swelling/inflammation/restriction, improving soft tissue extensibility.  (62345)    Service Based Modalities:      Timed Code Treatment Minutes: 26'  There ex    Total Treatment Minutes: 26'      Treatment/Activity Tolerance:  [x] Patient tolerated treatment well [] Patient limited by fatique  [] Patient limited by pain  [] Patient limited by other medical complications  [] Other:     Prognosis: [x] Good [] Fair  [] Poor    Patient Requires Follow-up: [x] Yes  [] No      Goals:  Short term goals  Time Frame for Short term goals: 1 week  Short term goal 1: Start HEP  Short term goal 2: Review NWB plan    Long term goals  Time Frame for Long term goals : 12 weeks  Long term goal 1: Go thru weight bearing protocol progression to eventual FWB in a shoe  Long term goal 2: Gait with no device for 30-40 min for return to community activity  Long term goal 3: Up/ down 6-8\" steps for independent home activity  Long term goal 4: Able to stand for 30-40 min for return to home tasks          Plan:   [x] Continue per plan of care [] Alter current plan (see comments)  [] Plan of care initiated [] Hold pending MD visit [] Discharge  Plan for Next Session:  Follow wt bearing schedule    Electronically signed by:  Perry Salgado PTA

## 2021-09-07 NOTE — PROGRESS NOTES
I have reviewed and agree to the content of the note written by the PTA.   Electronically signed by Gianni Almanzar PT [id number]

## 2021-09-08 ENCOUNTER — APPOINTMENT (OUTPATIENT)
Dept: PHYSICAL THERAPY | Age: 36
End: 2021-09-08
Payer: COMMERCIAL

## 2021-09-09 ENCOUNTER — HOSPITAL ENCOUNTER (OUTPATIENT)
Dept: PHYSICAL THERAPY | Age: 36
Setting detail: THERAPIES SERIES
Discharge: HOME | End: 2021-09-09
Payer: COMMERCIAL

## 2021-09-09 PROCEDURE — 97110 THERAPEUTIC EXERCISES: CPT

## 2021-09-09 NOTE — FLOWSHEET NOTE
for activities related to improving balance, coordination, kinesthetic sense, posture, motor skill, proprioception. (75115)    Therapeutic Activities:     [] Therapeutic activities, direct (one-on-one) patient contact (use of dynamic activities to improve functional performance). (08095)    Gait:   [] Provided training and instruction to the patient for ambulation re-education. (50987)    Self-Care/ADL's  [] Self-care/home management training and compensatory training, meal preparation, safety procedures, and instructions in use of assistive technology devices/adaptive equipment, direct one-on-one contact. (15850)    Home Exercise Program:  AROM dflex, pflex, inversion. Strap gastroc stretch & achilles stretch   [x] Reviewed/Progressed HEP activities related to strengthening, flexibility, endurance, ROM. (11138)  [] Reviewed/Progressed HEP activities related to improving balance, coordination, kinesthetic sense, posture, motor skill, proprioception.  (49728)    Manual Treatments:    [] Provided manual therapy to mobilize soft tissue/joints for the purpose of modulating pain, promoting relaxation,  increasing ROM, reducing/eliminating soft tissue swelling/inflammation/restriction, improving soft tissue extensibility.  (65954)    Service Based Modalities:      Timed Code Treatment Minutes: 28'  There ex    Total Treatment Minutes: 28'      Treatment/Activity Tolerance:  [x] Patient tolerated treatment well [] Patient limited by fatique  [] Patient limited by pain  [] Patient limited by other medical complications  [] Other:     Prognosis: [x] Good [] Fair  [] Poor    Patient Requires Follow-up: [x] Yes  [] No      Goals:  Short term goals  Time Frame for Short term goals: 1 week  Short term goal 1: Start HEP  Short term goal 2: Review NWB plan    Long term goals  Time Frame for Long term goals : 12 weeks  Long term goal 1: Go thru weight bearing protocol progression to eventual FWB in a shoe  Long term goal 2: Gait with no device for 30-40 min for return to community activity  Long term goal 3: Up/ down 6-8\" steps for independent home activity  Long term goal 4: Able to stand for 30-40 min for return to home tasks          Plan:   [x] Continue per plan of care [] Alter current plan (see comments)  [] Plan of care initiated [] Hold pending MD visit [] Discharge  Plan for Next Session:  Follow wt bearing schedule.      Electronically signed by:  Katherine Neal PT

## 2021-09-10 ENCOUNTER — APPOINTMENT (OUTPATIENT)
Dept: PHYSICAL THERAPY | Age: 36
End: 2021-09-10
Payer: COMMERCIAL

## 2021-09-13 ENCOUNTER — APPOINTMENT (OUTPATIENT)
Dept: PHYSICAL THERAPY | Age: 36
End: 2021-09-13
Payer: COMMERCIAL

## 2021-09-15 ENCOUNTER — APPOINTMENT (OUTPATIENT)
Dept: PHYSICAL THERAPY | Age: 36
End: 2021-09-15
Payer: COMMERCIAL

## 2021-09-17 ENCOUNTER — HOSPITAL ENCOUNTER (OUTPATIENT)
Dept: PHYSICAL THERAPY | Age: 36
Setting detail: THERAPIES SERIES
Discharge: HOME | End: 2021-09-17
Payer: COMMERCIAL

## 2021-09-17 PROCEDURE — 97110 THERAPEUTIC EXERCISES: CPT | Performed by: PHYSICAL THERAPIST

## 2021-09-17 NOTE — FLOWSHEET NOTE
Physical Therapy Daily Treatment Note    Date:  2021    Patient Name:  Farideh Taylor    :  1985  MRN: [id number]  Restrictions/Precautions:NWB in boot 6 weeks                                               PWB 25 % x2 weeks 21 - 10/1/21                                               PWB 50% x2 weeks 10/1/21 - 10/15/21                                               PWB  75% x 2 weeks 10/15/21 - 10/29/21                                               FWB in boot x 2 weeks  10/29/21 - 21   Medical/Treatment Diagnosis Information:   · Diagnosis: M76.72 L peroneal tendinitis, s/p peroneus brevis repair, debridement, tenolysis, peroneus longus debridement, tenolysis  · Treatment Diagnosis: S/P L peroneus brevis repair, debridement, tenolysis, peroneus longus debridement, tenolysis 91  Insurance/Certification information:  PT Insurance Information: [place]  Physician Information:  Referring Practitioner: Brandi Real of care signed (Y/N): y    Visit# / total visits:  4/10  Pain level: 0/10       Time In: 8:37   Time Out: 9:10     Progress Note: []  Yes  [x]  No  Next due by: Visit #10, or 21      Subjective: \"I only get occasional pain when I am doing the (inversion) exercise at home. I only go to the point where I feel discomfort and then I stop there. I'm still using the boot and a knee scooter around the house and around the community when I need to leave my house. I do have a walker and crutches at home so I am ready for when I can start putting weight through the foot. \"      Talked to [facility] to confirm patient can begin partial WBing (25% in boot) as of today    Objective: CLARENCE performed per flow sheet for improved mobility and strengthening. Verbal cueing for sequencing and technique of exercise. Patient tolerated well, improved AROM noted.        Observation:   Test measurements:      Exercises:   Exercise/Equipment Resistance/Repetitions Other comments   AROM dflex, pflex, inversion 15x (ADV) No eversion    Gastro stretch knee extended 30\" x3 With strap   Achilles stretch, knee bent 30\", x3 With strap        Review NWB all time Reviewed         Gait RW 25% WBing 1 lap with RW, confirmed 25% (35-40 pounds) with scales         Toe Curls/ext 20x                              [x] Provided verbal/tactile cueing for activities related to strengthening, flexibility, endurance, ROM. (98875)  [] Provided verbal/tactile cueing for activities related to improving balance, coordination, kinesthetic sense, posture, motor skill, proprioception. (56034)    Therapeutic Activities:     [] Therapeutic activities, direct (one-on-one) patient contact (use of dynamic activities to improve functional performance). (03218)    Gait:   [] Provided training and instruction to the patient for ambulation re-education. (05041)    Self-Care/ADL's  [] Self-care/home management training and compensatory training, meal preparation, safety procedures, and instructions in use of assistive technology devices/adaptive equipment, direct one-on-one contact. (25315)    Home Exercise Program:  AROM dflex, pflex, inversion. Strap gastroc stretch & achilles stretch   [x] Reviewed/Progressed HEP activities related to strengthening, flexibility, endurance, ROM. (78760)  [] Reviewed/Progressed HEP activities related to improving balance, coordination, kinesthetic sense, posture, motor skill, proprioception.  (06477)    Manual Treatments:    [] Provided manual therapy to mobilize soft tissue/joints for the purpose of modulating pain, promoting relaxation,  increasing ROM, reducing/eliminating soft tissue swelling/inflammation/restriction, improving soft tissue extensibility.  (18781)    Service Based Modalities:      Timed Code Treatment Minutes: 35'  There ex    Total Treatment Minutes: 35'      Treatment/Activity Tolerance:  [x] Patient tolerated treatment well [] Patient limited by fatique  [] Patient limited by pain  [] Patient limited by other medical complications  [] Other:     Prognosis: [x] Good [] Fair  [] Poor    Patient Requires Follow-up: [x] Yes  [] No      Goals:  Short term goals  Time Frame for Short term goals: 1 week  Short term goal 1: Start HEP MET  Short term goal 2: Review NWB plan MET    Long term goals  Time Frame for Long term goals : 12 weeks  Long term goal 1: Go thru weight bearing protocol progression to eventual FWB in a shoe  Long term goal 2: Gait with no device for 30-40 min for return to community activity  Long term goal 3: Up/ down 6-8\" steps for independent home activity  Long term goal 4: Able to stand for 30-40 min for return to home tasks    Plan:   [x] Continue per plan of care [] Alter current plan (see comments)  [] Plan of care initiated [] Hold pending MD visit [] Discharge    Plan for Next Session:  Follow wt bearing schedule.      Electronically signed by:  Clari Tavares, PT, DPT

## 2021-09-23 ENCOUNTER — HOSPITAL ENCOUNTER (OUTPATIENT)
Dept: PHYSICAL THERAPY | Age: 36
Setting detail: THERAPIES SERIES
Discharge: HOME | End: 2021-09-23
Payer: COMMERCIAL

## 2021-09-23 PROCEDURE — 97110 THERAPEUTIC EXERCISES: CPT | Performed by: PHYSICAL THERAPY ASSISTANT

## 2021-09-23 PROCEDURE — 97116 GAIT TRAINING THERAPY: CPT | Performed by: PHYSICAL THERAPY ASSISTANT

## 2021-09-23 NOTE — FLOWSHEET NOTE
Physical Therapy Daily Treatment Note    Date:  2021    Patient Name:  Lyndon Ashton    :  1985  MRN: [id number]  Restrictions/Precautions:NWB in boot 6 weeks                                               PWB 25 % x2 weeks 21 - 10/1/21                                               PWB 50% x2 weeks 10/1/21 - 10/15/21                                               PWB  75% x 2 weeks 10/15/21 - 10/29/21                                               FWB in boot x 2 weeks  10/29/21 - 21   Medical/Treatment Diagnosis Information:   · Diagnosis: M76.72 L peroneal tendinitis, s/p peroneus brevis repair, debridement, tenolysis, peroneus longus debridement, tenolysis  · Treatment Diagnosis: S/P L peroneus brevis repair, debridement, tenolysis, peroneus longus debridement, tenolysis 3/4/96  Insurance/Certification information:  PT Insurance Information: [employer]  Physician Information:  Referring Practitioner: Tommy Powers of care signed (Y/N): y    Visit# / total visits:  /10  Pain level: 0/10       Time NJ:[id number]   Time Out: 1022    Progress Note: []  Yes  [x]  No  Next due by: Visit #10, or 21      Subjective: Pain this date rated 0/10. Notes pain at worst rated 8/10 with active inversion. Notes understanding and ongoing attempts at 25% weight bearing. RTD 10/28/21. Objective: CLARENCE performed per flow sheet for improved mobility and strengthening per protocol. Verbal cuing for proper technique and order of exercises. Reviewed weight bearing restrictions and gait training with 25%. Understanding noted.       Observation:   Test measurements:      Exercises:   Exercise/Equipment Resistance/Repetitions Other comments   AROM dflex, pflex, inversion 20x  No eversion    Gastro stretch knee extended 30\" x3 With strap   Achilles stretch, knee bent 30\", x3 With strap        Review NWB all time Reviewed         Gait RW 25% WBing 1 lap with RW, confirmed 25% (35-40 pounds) with scales         Toe Curls/ext 20x                              [x] Provided verbal/tactile cueing for activities related to strengthening, flexibility, endurance, ROM. (29178)  [] Provided verbal/tactile cueing for activities related to improving balance, coordination, kinesthetic sense, posture, motor skill, proprioception. (65372)    Therapeutic Activities:     [] Therapeutic activities, direct (one-on-one) patient contact (use of dynamic activities to improve functional performance). (30806)    Gait:   [] Provided training and instruction to the patient for ambulation re-education. (33543)    Self-Care/ADL's  [] Self-care/home management training and compensatory training, meal preparation, safety procedures, and instructions in use of assistive technology devices/adaptive equipment, direct one-on-one contact. (03722)    Home Exercise Program:  AROM dflex, pflex, inversion. Strap gastroc stretch & achilles stretch   [x] Reviewed/Progressed HEP activities related to strengthening, flexibility, endurance, ROM. (61388)  [] Reviewed/Progressed HEP activities related to improving balance, coordination, kinesthetic sense, posture, motor skill, proprioception.  (23787)    Manual Treatments:    [] Provided manual therapy to mobilize soft tissue/joints for the purpose of modulating pain, promoting relaxation,  increasing ROM, reducing/eliminating soft tissue swelling/inflammation/restriction, improving soft tissue extensibility.  (21268)    Service Based Modalities:      Timed Code Treatment Minutes: 23'  There ex    Total Treatment Minutes: 23'      Treatment/Activity Tolerance:  [x] Patient tolerated treatment well [] Patient limited by fatique  [] Patient limited by pain  [] Patient limited by other medical complications  [] Other:     Prognosis: [x] Good [] Fair  [] Poor    Patient Requires Follow-up: [x] Yes  [] No      Goals:  Short term goals  Time Frame for Short term goals: 1 week  Short term goal 1: Start HEP MET  Short term goal 2: Review

## 2021-09-24 NOTE — PROGRESS NOTES
I have reviewed and agree to the content of the note written by the PTA.   Electronically signed by Gianni Almanzar PT [id number]

## 2021-09-28 ENCOUNTER — OFFICE VISIT (OUTPATIENT)
Dept: ORTHOPEDIC SURGERY | Age: 36
End: 2021-09-28

## 2021-09-28 VITALS — BODY MASS INDEX: 44.41 KG/M2 | WEIGHT: 293 LBS | HEIGHT: 68 IN

## 2021-09-28 DIAGNOSIS — M76.72 PERONEAL TENDINITIS OF LEFT LOWER EXTREMITY: Primary | ICD-10-CM

## 2021-09-28 PROCEDURE — 99024 POSTOP FOLLOW-UP VISIT: CPT | Performed by: ORTHOPAEDIC SURGERY

## 2021-09-28 NOTE — PROGRESS NOTES
[facility]  [address] [address]  Dept: [phone]    Ambulatory Orthopedic Postoperative Visit     Preoperative Diagnosis:   1. Left peroneus brevis tear, tendinopathy and scar tissue  2. 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 Insulin-dependent diabetes with neuropathy  4. History of tobacco use  5. History of CRPS type II  6. History of heart disease  7. Body mass index is 45.61 kg/m².     Postoperative Diagnosis:   1. Same as above     Procedures Performed:  (8/5/2021)  1. Left peroneus brevis repair, debridement and tenolysis  2. Left peroneus longus debridement and tenolysis      SUBJECTIVE:     The patient returns post op from the above stated procedure. Reports doing well overall, reports improved pain, denies wound drainage/issues, fevers/chills/night sweats, calf swelling/pain, chest pain, shortness of breath. She denies any pain with ambulation in cam boot. OBJECTIVE:  Ht 5' 8\" (1.727 m)   Wt 300 lb (136.1 kg)   BMI 45.61 kg/m²    NAD, resting comfortably  Incisions clean/dry/intact, no erythema/dehiscence/drainage  Sensation to light touch grossly intact throughout  Warm and well perfused  Grossly neurovascularly intact distally  No signs of infection  No calf swelling/tenderness  -Able to fire peroneal tendons      RADIOLOGY:   9/28/2021 No new radiology images today. Prior images reviewed for reference.       ASSESSMENT AND PLAN:     8 weeks s/p above, doing well overall      She has a history of bilateral (left greater than right) peroneal tendinopathy, with underlying mild cavus radiographically and equinus.  She also reports a history of left ankle instability (negative physical exam findings).     Notably, she has a complex past medical history.  She has a history of heart disease, insulin-dependent diabetes with neuropathy, history of CRPS type II (she is unable to clarify details surrounding this diagnosis), and tobacco use (reports she smokes 1/2 pack cigarettes per day, but reports that she quit on 7/12/2021 ). Precautions:  nonweight bearing x 6 weeks anticipated on the Left lower extremity--completed             -She may begin weightbearing as tolerated in the boot beginning now             [x]? Physical Therapy/Home exercises    --okay to perform dorsiflexion/plantarflexion, no active eversion, may perform active inversion     Immobilization:      []? Splint/Cast               [x]? CAM boot                    []?  Comfortable shoe    [x]? Other: Lace up ankle brace, which was provided today--she may wean out of the cam boot into the lace up ankle brace after 10 weeks               DVT ppx:   [x]? Early mobilization              []?  Medication as prescribed (Lovenox)                    [x]? No chemical ppx needed; mechanical only             -     Pain control:  Medication as prescribed (dosing and quantity) indicated for acute postoperative pain control             -     Special concerns:       [x]? Glucose control            [x]? Tobacco cessation           [x]? Avoid strenuous activity/pain provoking maneuvers and high-impact repetitive exercises     -        All questions were answered and the patient agrees with the above plan. The patient will return to clinic in 4 weeks without x-rays         No follow-ups on file. No orders of the defined types were placed in this encounter. No orders of the defined types were placed in this encounter. Sakina Rodriguez MD  Orthopedic Surgery        Please excuse any typos/errors, as this note was created with the assistance of voice recognition software. While intending to generate a document that actually reflects the content of the visit, the document can still have some errors including those of syntax and sound-a-like substitutions which may escape proof reading.  In such instances, actual meaning can be extrapolated by context.

## 2021-09-29 ENCOUNTER — HOSPITAL ENCOUNTER (OUTPATIENT)
Dept: PHYSICAL THERAPY | Age: 36
Setting detail: THERAPIES SERIES
Discharge: HOME | End: 2021-09-29
Payer: COMMERCIAL

## 2021-09-29 PROCEDURE — 97110 THERAPEUTIC EXERCISES: CPT | Performed by: PHYSICAL THERAPY ASSISTANT

## 2021-09-29 NOTE — FLOWSHEET NOTE
Physical Therapy Daily Treatment Note    Date:  2021    Patient Name:  Ebony Leary    :  1985  MRN: [id number]  Restrictions/Precautions:NWB in boot 6 weeks                                               PWB 25 % x2 weeks 21 - 10/1/21                                               PWB 50% x2 weeks 10/1/21 - 10/15/21                                               PWB  75% x 2 weeks 10/15/21 - 10/29/21                                               FWB in boot x 2 weeks  10/29/21 - 21   Medical/Treatment Diagnosis Information:   · Diagnosis: M76.72 L peroneal tendinitis, s/p peroneus brevis repair, debridement, tenolysis, peroneus longus debridement, tenolysis  · Treatment Diagnosis: S/P L peroneus brevis repair, debridement, tenolysis, peroneus longus debridement, tenolysis 59  Insurance/Certification information:  PT Insurance Information: [employer]  Physician Information:  Referring Practitioner: Therese James of care signed (Y/N): y    Visit# / total visits:  6/10  Pain level: 0/10       Time In: 247  Time Out: 315    Progress Note: []  Yes  [x]  No  Next due by: Visit #10, or 21      Subjective: Pain this date rated 0/10. Pt. States physician approved FWB at this time in CAM boot and FWB in lace up boot in 2 weeks. Objective:  Message sent to physician for clarification. CLARENCE performed per flow sheet for improved mobility and strengthening per protocol. Verbal cuing for proper technique and order of exercises. Encouraged patient to continue to utilize walker for weight bearing until further instructed. Understanding noted.       Observation:   Test measurements:  AROM DF: 5°      PF: 55°    Exercises:   Exercise/Equipment Resistance/Repetitions Other comments   AROM dflex, pflex, inversion 20x  No eversion    Gastro stretch knee extended 30\" x3 With strap   Achilles stretch, knee bent 30\", x3 With strap        Review NWB all time Reviewed         Gait RW 25% WBing 1 lap with RW, confirmed 25% (35-40 pounds) with scales         Toe Curls/ext 20x                              [x] Provided verbal/tactile cueing for activities related to strengthening, flexibility, endurance, ROM. (30241)  [] Provided verbal/tactile cueing for activities related to improving balance, coordination, kinesthetic sense, posture, motor skill, proprioception. (47035)    Therapeutic Activities:     [] Therapeutic activities, direct (one-on-one) patient contact (use of dynamic activities to improve functional performance). (67258)    Gait:   [] Provided training and instruction to the patient for ambulation re-education. (99531)    Self-Care/ADL's  [] Self-care/home management training and compensatory training, meal preparation, safety procedures, and instructions in use of assistive technology devices/adaptive equipment, direct one-on-one contact. (36895)    Home Exercise Program:  AROM dflex, pflex, inversion. Strap gastroc stretch & achilles stretch   [x] Reviewed/Progressed HEP activities related to strengthening, flexibility, endurance, ROM. (84535)  [] Reviewed/Progressed HEP activities related to improving balance, coordination, kinesthetic sense, posture, motor skill, proprioception.  (81621)    Manual Treatments:    [] Provided manual therapy to mobilize soft tissue/joints for the purpose of modulating pain, promoting relaxation,  increasing ROM, reducing/eliminating soft tissue swelling/inflammation/restriction, improving soft tissue extensibility.  (35132)    Service Based Modalities:      Timed Code Treatment Minutes: 28'  There ex    Total Treatment Minutes: 28'      Treatment/Activity Tolerance:  [x] Patient tolerated treatment well [] Patient limited by fatique  [] Patient limited by pain  [] Patient limited by other medical complications  [] Other:     Prognosis: [x] Good [] Fair  [] Poor    Patient Requires Follow-up: [x] Yes  [] No      Goals:  Short term goals  Time Frame for Short term goals: 1 week  Short term goal 1: Start HEP MET  Short term goal 2: Review NWB plan MET    Long term goals  Time Frame for Long term goals : 12 weeks  Long term goal 1: Go thru weight bearing protocol progression to eventual FWB in a shoe (Ongoing, message sent to clarify weight bearing status)  Long term goal 2: Gait with no device for 30-40 min for return to community activity (Ongoing  Long term goal 3: Up/ down 6-8\" steps for independent home activity (Ongoing)  Long term goal 4: Able to stand for 30-40 min for return to home tasks ( Ongoing)    Plan:   [x] Continue per plan of care [] Alter current plan (see comments)  [] Plan of care initiated [] Hold pending MD visit [] Discharge    Plan for Next Session:  Follow wt bearing schedule. Electronically signed by:   Aristeo CompanySARA

## 2021-09-30 NOTE — PROGRESS NOTES
I have reviewed and agree to the content of the note written by the PTA.   Electronically signed by Kiersten Tucker PT [id number]

## 2021-10-05 ENCOUNTER — HOSPITAL ENCOUNTER (OUTPATIENT)
Dept: PHYSICAL THERAPY | Age: 36
Setting detail: THERAPIES SERIES
Discharge: HOME | End: 2021-10-05
Payer: COMMERCIAL

## 2021-10-05 PROCEDURE — 97110 THERAPEUTIC EXERCISES: CPT | Performed by: PHYSICAL THERAPY ASSISTANT

## 2021-10-05 NOTE — FLOWSHEET NOTE
Physical Therapy Daily Treatment Note    Date:  10/5/2021    Patient Name:  Gail Vargas    :  1985  MRN: [id number]     Restrictions/Precautions: Lace up brace at 10 weeks (10/13/21)      Medical/Treatment Diagnosis Information:   · Diagnosis: M76.72 L peroneal tendinitis, s/p peroneus brevis repair, debridement, tenolysis, peroneus longus debridement, tenolysis  · Treatment Diagnosis: S/P L peroneus brevis repair, debridement, tenolysis, peroneus longus debridement, tenolysis 36  Insurance/Certification information:  PT Insurance Information: [facility]  Physician Information:  Referring Practitioner: Tatyana Napier of care signed (Y/N): y    Visit# / total visits:  7/10 (20 total for year)  Pain level: 0/10       Time In: 903  Time Out: 78    Progress Note: []  Yes  [x]  No  Next due by: Visit #10, or 21      Subjective: Pain this date rated 2-3/10 through medial ankle. Min increase in pain noted with increased weight bearing over last week. Sx:     Objective:  Received message from referring provider stating patient can be WBAT within walking boot. CLARENCE complete per flow chart to facilitate strength, motion and stability to allow ease with daily activities and ambulation. Patient gait trained with straight cane this date and patient reports planning to use cane or no AD at home. Shows overall good technique.     Observation:   Test measurements:      Exercises:   Exercise/Equipment Resistance/Repetitions Other comments   AROM dflex, pflex, inversion 20x  No eversion    Gastro stretch knee extended 30\" x3 With strap   Achilles stretch, knee bent 30\", x3 With strap        Review NWB all time Reviewed                   Toe Curls/ext 20x               Nu Step 5' L5    Counter Ex 15x (IN BOOT) Hip 3-way, HS curls, marching        [x] Provided verbal/tactile cueing for activities related to strengthening, flexibility, endurance, ROM. (85803)  [] Provided verbal/tactile cueing for activities related to improving balance, coordination, kinesthetic sense, posture, motor skill, proprioception. (73479)    Therapeutic Activities:     [] Therapeutic activities, direct (one-on-one) patient contact (use of dynamic activities to improve functional performance). (78899)    Gait:   [] Provided training and instruction to the patient for ambulation re-education. (77765)    Self-Care/ADL's  [] Self-care/home management training and compensatory training, meal preparation, safety procedures, and instructions in use of assistive technology devices/adaptive equipment, direct one-on-one contact. (66603)    Home Exercise Program:  AROM dflex, pflex, inversion. Strap gastroc stretch & achilles stretch   [x] Reviewed/Progressed HEP activities related to strengthening, flexibility, endurance, ROM. (15288)  [] Reviewed/Progressed HEP activities related to improving balance, coordination, kinesthetic sense, posture, motor skill, proprioception.  (84004)    Manual Treatments:    [] Provided manual therapy to mobilize soft tissue/joints for the purpose of modulating pain, promoting relaxation,  increasing ROM, reducing/eliminating soft tissue swelling/inflammation/restriction, improving soft tissue extensibility.  (17041)    Service Based Modalities:      Timed Code Treatment Minutes: 32'  There ex    Total Treatment Minutes: 32     Treatment/Activity Tolerance:  [x] Patient tolerated treatment well [] Patient limited by fatique  [] Patient limited by pain  [] Patient limited by other medical complications  [] Other:     Prognosis: [x] Good [] Fair  [] Poor    Patient Requires Follow-up: [x] Yes  [] No      Goals:  Short term goals  Time Frame for Short term goals: 1 week  Short term goal 1: Start HEP MET  Short term goal 2: Review NWB plan MET    Long term goals  Time Frame for Long term goals : 12 weeks  Long term goal 1: Go thru weight bearing protocol progression to eventual FWB in a shoe (Ongoing, message sent to clarify weight bearing status)  Long term goal 2: Gait with no device for 30-40 min for return to community activity (Ongoing  Long term goal 3: Up/ down 6-8\" steps for independent home activity (Ongoing)  Long term goal 4: Able to stand for 30-40 min for return to home tasks ( Ongoing)    Plan:   [x] Continue per plan of care [] Alter current plan (see comments)  [] Plan of care initiated [] Hold pending MD visit [] Discharge    Plan for Next Session: Monitor tolerance to increased weight bearing and advance as able. Electronically signed by:   Aristeo CompanySARA,

## 2021-10-06 NOTE — PLAN OF CARE
[facility]    [x] [place]  Phone: [phone]  Fax: [phone]      [] [place]  Phone: [phone]  Fax: [phone]    Physical Therapy Progress Note  Date: 10/6/2021        Patient Name:  Thad Williamson    :  1985  MRN: [id number]  Restrictions/Precautions:    Lace up brace at 10 weeks (10/13/21)  Medical/Treatment Diagnosis Information:  ·   Diagnosis: M76.72 L peroneal tendinitis, s/p peroneus brevis repair, debridement, tenolysis, peroneus longus debridement, tenolysis  · Treatment Diagnosis: S/P L peroneus brevis repair, debridement, tenolysis, peroneus longus debridement, tenolysis 21  ·    Insurance/Certification information:    [facility]  Physician Information:     [place]  Plan of care signed (Y/N): y  Visit# / total visits:  7  Pain level: 0/10     Time Period for Report: 21 - 10/5/21   Cancels/No-shows to date:      Plan of Care/Treatment to date:  [x] Therapeutic Exercise    [] Modalities:  [] Therapeutic Activity     [] Ultrasound  [] Electrical Stimulation  [x] Gait Training      [] Cervical Traction    [] Lumbar Traction  [] Neuromuscular Re-education  [] Cold/hotpack [] Iontophoresis  [x] Instruction in HEP      Other:  [] Manual Therapy       []    [] Aquatic Therapy       []                    ? Subjective:    Pain this date rated 2-3/10 through medial ankle. Min increase in pain noted with increased weight bearing over last week. Pt. States physician approved FWB at this time in CAM boot and FWB in lace up boot in 2 weeks. Objective:     Received message from referring provider stating patient can be WBAT within walking boot. Therefore original weight bearing progression schedule has been accelerated.            Plan:    Continue rehab        Goals:    Short term goals  Time Frame for Short term goals: 1 week  Short term goal 1: Start HEP MET  Short term goal 2: Review NWB plan MET     Long term goals  Time Frame for Long term goals : 12 weeks  Long term goal 1: Go thru weight bearing protocol progression to eventual FWB in a shoe - part met  Long term goal 2: Gait with no device for 30-40 min for return to community activity (Ongoing  Long term goal 3: Up/ down 6-8\" steps for independent home activity (Ongoing)  Long term goal 4: Able to stand for 30-40 min for return to home tasks ( Ongoing)              Current Frequency/Duration:10/5/21 - 11/4/21  # Days per week: [] 1 day # Weeks: [] 1 week [x] 4 weeks      [] 2 days? [] 2 weeks [] 5 weeks      [x] 3 days   [] 3 weeks [] 6 weeks     Rehab Potential: [] Excellent [x] Good [] Fair  [] Poor     Goal Status:  [] Achieved [x] Partially Achieved  [] Not Achieved     Patient Status: [] Continue per initial plan of Care     [] Patient now discharged     [x] Additional visits requested, Please re-certify for additional visits:      Requested frequency/duration: 2-3 X/week for4 weeks    Electronically signed by:  Jackie Mohamud PT      If you have any questions or concerns, please don't hesitate to call.   Thank you for your referral.    Physician Signature:________________________________Date:__________________  By signing above, therapists plan is approved by physician

## 2021-10-06 NOTE — PROGRESS NOTES
I have reviewed and agree to the content of the note written by the PTA.   Electronically signed by Pablo Diego PT [id number]

## 2021-10-07 ENCOUNTER — HOSPITAL ENCOUNTER (OUTPATIENT)
Dept: PHYSICAL THERAPY | Age: 36
Setting detail: THERAPIES SERIES
Discharge: HOME | End: 2021-10-07
Payer: COMMERCIAL

## 2021-10-07 PROCEDURE — 97110 THERAPEUTIC EXERCISES: CPT

## 2021-10-07 NOTE — FLOWSHEET NOTE
Physical Therapy Daily Treatment Note    Date:  10/7/2021    Patient Name:  Vandana Bolivar    :  1985  MRN: [id number]     Restrictions/Precautions: Lace up brace at 10 weeks (10/13/21)      Medical/Treatment Diagnosis Information:   · Diagnosis: M76.72 L peroneal tendinitis, s/p peroneus brevis repair, debridement, tenolysis, peroneus longus debridement, tenolysis  · Treatment Diagnosis: S/P L peroneus brevis repair, debridement, tenolysis, peroneus longus debridement, tenolysis   Insurance/Certification information:  PT Insurance Information: [facility]  Physician Information:  Referring Practitioner: Carisa Bergman of care signed (Y/N): y    Visit# / total visits:  8/10 (21 total for year)  Pain level: 0/10       Time In: 11:32 AM Time Out: 12:10 PM    Progress Note: []  Yes  [x]  No  Next due by: Visit #10, or 21      Subjective: Patient reports she is having slight pain in her medial ankle. Patient reports she don't use the cane in the house and cane in the community. Patient reports compliance with HEP. Sx:     Objective:  Performed ther-ex as documented on flowsheet to improve strength, ROM, and stability for ease with daily tasks and ambulation. Patient instructed to bring tennis shoe and ASO brace to next visit. Initiated new exercises with good tolerance. Patient provided with updated HEP with good understanding.   Observation: WBAT in CAM boot with straight cane  Test measurements:  AROM ankle DF: 5 degrees    Exercises:   Exercise/Equipment Resistance/Repetitions Other comments   AROM dflex, pflex, inversion 20x  No eversion    Gastro stretch knee extended 30\" x3 With strap   Achilles stretch, knee bent 30\", x3 With strap                            Toe Curls/ext 20x               Nu Step 5' L5    Counter Ex 20x (IN BOOT) Hip 3-way, HS curls, marching   Sit to Stands 10x2    Step ups 10x 4\" fwd   [x] Provided verbal/tactile cueing for activities related to strengthening, flexibility, endurance, ROM. (88652)  [] Provided verbal/tactile cueing for activities related to improving balance, coordination, kinesthetic sense, posture, motor skill, proprioception. (55429)    Therapeutic Activities:     [] Therapeutic activities, direct (one-on-one) patient contact (use of dynamic activities to improve functional performance). (95641)    Gait:   [] Provided training and instruction to the patient for ambulation re-education. (68180)    Self-Care/ADL's  [] Self-care/home management training and compensatory training, meal preparation, safety procedures, and instructions in use of assistive technology devices/adaptive equipment, direct one-on-one contact. (92409)    Home Exercise Program:  AROM dflex, pflex, inversion. Strap gastroc stretch & achilles stretch   [x] Reviewed/Progressed HEP activities related to strengthening, flexibility, endurance, ROM. (59390)  [] Reviewed/Progressed HEP activities related to improving balance, coordination, kinesthetic sense, posture, motor skill, proprioception.  (56143)    Manual Treatments:    [] Provided manual therapy to mobilize soft tissue/joints for the purpose of modulating pain, promoting relaxation,  increasing ROM, reducing/eliminating soft tissue swelling/inflammation/restriction, improving soft tissue extensibility.  (15088)    Service Based Modalities:      Timed Code Treatment Minutes: 45'  There ex    Total Treatment Minutes: 45'    Treatment/Activity Tolerance:  [x] Patient tolerated treatment well [] Patient limited by fatique  [] Patient limited by pain  [] Patient limited by other medical complications  [] Other:     Prognosis: [x] Good [] Fair  [] Poor    Patient Requires Follow-up: [x] Yes  [] No      Goals:  Short term goals  Time Frame for Short term goals: 1 week  Short term goal 1: Start HEP MET  Short term goal 2: Review NWB plan MET    Long term goals  Time Frame for Long term goals : 12 weeks  Long term goal 1: Go thru weight bearing protocol progression to eventual FWB in a shoe (Ongoing, message sent to clarify weight bearing status)  Long term goal 2: Gait with no device for 30-40 min for return to community activity (Ongoing  Long term goal 3: Up/ down 6-8\" steps for independent home activity (Ongoing)  Long term goal 4: Able to stand for 30-40 min for return to home tasks ( Ongoing)    Plan:   [x] Continue per plan of care [] Alter current plan (see comments)  [] Plan of care initiated [] Hold pending MD visit [] Discharge    Plan for Next Session: Monitor tolerance to increased weight bearing and advance as able.      Electronically signed by:  Perry Zuniga PTA,

## 2021-10-08 NOTE — PROGRESS NOTES
I have reviewed and agree to the content of the note written by the PTA.   Electronically signed by Sangeetha Found PT [id number]

## 2021-10-13 ENCOUNTER — HOSPITAL ENCOUNTER (OUTPATIENT)
Dept: PHYSICAL THERAPY | Age: 36
Setting detail: THERAPIES SERIES
Discharge: HOME | End: 2021-10-13
Payer: COMMERCIAL

## 2021-10-13 PROCEDURE — 97110 THERAPEUTIC EXERCISES: CPT

## 2021-10-14 NOTE — PROGRESS NOTES
I have reviewed and agree to the content of the note written by the PTA.   Electronically signed by Dennis Aguayo PT [id number]

## 2021-10-15 ENCOUNTER — HOSPITAL ENCOUNTER (OUTPATIENT)
Dept: PHYSICAL THERAPY | Age: 36
Setting detail: THERAPIES SERIES
Discharge: HOME | End: 2021-10-15
Payer: COMMERCIAL

## 2021-10-15 PROCEDURE — 97110 THERAPEUTIC EXERCISES: CPT

## 2021-10-15 NOTE — FLOWSHEET NOTE
Physical Therapy Daily Treatment Note    Date:  10/15/2021    Patient Name:  Maribel Pretty    :  1985  MRN: [id number]     Restrictions/Precautions: Lace up brace at 10 weeks (10/13/21)      Medical/Treatment Diagnosis Information:   · Diagnosis: M76.72 L peroneal tendinitis, s/p peroneus brevis repair, debridement, tenolysis, peroneus longus debridement, tenolysis  · Treatment Diagnosis: S/P L peroneus brevis repair, debridement, tenolysis, peroneus longus debridement, tenolysis 3/0/30  Insurance/Certification information:  PT Insurance Information: [facility]  Physician Information:  Referring Practitioner: Tomasz Marroquin of care signed (Y/N): y    Visit# / total visits:  10/10 (21 total for year)  Pain level: 0/10       Time In: 11:21 AM Time Out: 11:59 PM    Progress Note: []  Yes  [x]  No  Next due by: Visit #10, or 21        Subjective: Patient reports she felt sore after last treatment. Pt reports feeling better on this date with very little pain/discomfort in her medial ankle. Patient reports she is not using a cane at home house, but does in the community. Patient reports compliance with HEP. Sx:     Objective:  Performed ther-ex as documented on flowsheet to improve strength, ROM, and stability for ease with daily tasks and ambulation. Stairwell exercise added to facilitate independence with home and community activities. Patient performed session in tennis shoe and ASO brace with good tolerance. Pt notes slight increased discomfort in lateral aspect of L foot and ankle when performing standing exercises in tennis shoes. Pt advised to wear ASO brace 2hrs daily to increase tolerance.     Observation: slight antalgic gait when ambulating in tennis shoe  Test measurements:     Exercises:   Exercise/Equipment Resistance/Repetitions Other comments   AROM dflex, pflex, inversion 20x  No eversion    Gastro stretch knee extended 30\" x3 With strap   Achilles stretch, knee bent 30\", x3 With strap stairwell 1x              Toe Curls/ext 20x               Nu Step 5' L5    Counter Ex 10x2 (In Tennis Shoe) Hip 3-way, HS curls, marching   Sit to Stands 10x2    Step ups 10x 4\" fwd   [x] Provided verbal/tactile cueing for activities related to strengthening, flexibility, endurance, ROM. (22627)  [] Provided verbal/tactile cueing for activities related to improving balance, coordination, kinesthetic sense, posture, motor skill, proprioception. (06676)    Therapeutic Activities:     [] Therapeutic activities, direct (one-on-one) patient contact (use of dynamic activities to improve functional performance). (75894)    Gait:   [] Provided training and instruction to the patient for ambulation re-education. (80894)    Self-Care/ADL's  [] Self-care/home management training and compensatory training, meal preparation, safety procedures, and instructions in use of assistive technology devices/adaptive equipment, direct one-on-one contact. (98608)    Home Exercise Program:  AROM dflex, pflex, inversion. Strap gastroc stretch & achilles stretch   [x] Reviewed/Progressed HEP activities related to strengthening, flexibility, endurance, ROM. (40861)  [] Reviewed/Progressed HEP activities related to improving balance, coordination, kinesthetic sense, posture, motor skill, proprioception.  (88523)    Manual Treatments:    [] Provided manual therapy to mobilize soft tissue/joints for the purpose of modulating pain, promoting relaxation,  increasing ROM, reducing/eliminating soft tissue swelling/inflammation/restriction, improving soft tissue extensibility.  (65178)    Service Based Modalities:      Timed Code Treatment Minutes: 45'  There ex    Total Treatment Minutes: 45'    Treatment/Activity Tolerance:  [x] Patient tolerated treatment well [] Patient limited by fatique  [] Patient limited by pain  [] Patient limited by other medical complications  [] Other:     Prognosis: [x] Good [] Fair  [] Poor    Patient Requires

## 2021-10-15 NOTE — PLAN OF CARE
[facility]    [x] [place]  Phone: [phone]  Fax: [phone]      [] [place]  Phone: [phone]  Fax: [phone]    Physical Therapy Progress Note  Date: 10/15/2021        Patient Name:  Bell Cade    :  1985  MRN: [id number]  Restrictions/Precautions:      Medical/Treatment Diagnosis Information:  ·   Diagnosis: M76.72 L peroneal tendinitis, s/p peroneus brevis repair, debridement, tenolysis, peroneus longus debridement, tenolysis  · Treatment Diagnosis: S/P L peroneus brevis repair, debridement, tenolysis, peroneus longus debridement, tenolysis 21  ·    Insurance/Certification information:    [facility]  Physician Information:    [place]  Plan of care signed (Y/N): y  Visit# / total visits: 21  Pain level: 0/10     Plan of Care/Treatment to date:  [x] Therapeutic Exercise    [] Modalities:  [] Therapeutic Activity     [] Ultrasound  [] Electrical Stimulation  [x] Gait Training      [] Cervical Traction    [] Lumbar Traction  [] Neuromuscular Re-education  [] Cold/hotpack [] Iontophoresis  [x] Instruction in HEP      Other:  [x] Manual Therapy       []    [] Aquatic Therapy       []                    ?        Subjective:     Patient reports she is not using a cane at home house, but does in the community         Objective:    Lace up brace at 10 weeks (10/13/21)  Transitioning to a tennis shoe            Plan:    Continue progression of wt bearing into regular footwear       Goals:    Short term goals  Time Frame for Short term goals: 1 week  Short term goal 1: Start HEP MET  Short term goal 2: Review NWB plan MET     Long term goals  Time Frame for Long term goals : 12 weeks  Long term goal 1: Go thru weight bearing protocol progression to eventual FWB in a shoe -Part met  Long term goal 2: Gait with no device for 30-40 min for return to community activity - part met  Long term goal 3: Up/ down 6-8\" steps for independent home activity -part met  Long term goal 4: Able to stand for 30-40 min for return to home tasks -part met           Current Frequency/Duration:10/15/21 - 11/14/21  # Days per week: [] 1 day # Weeks: [] 1 week [x] 4 weeks      [x] 2 days? [] 2 weeks [] 5 weeks      [] 3 days   [] 3 weeks [] 6 weeks     Rehab Potential: [] Excellent [x] Good [] Fair  [] Poor     Goal Status:  [] Achieved [x] Partially Achieved  [] Not Achieved     Patient Status: [] Continue per initial plan of Care     [] Patient now discharged     [x] Additional visits requested, Please re-certify for additional visits:      Requested frequency/duration:  2X/week for4 weeks    Electronically signed by:  Francesco Mina PT      If you have any questions or concerns, please don't hesitate to call.   Thank you for your referral.    Physician Signature:________________________________Date:__________________  By signing above, therapists plan is approved by physician

## 2021-10-18 NOTE — PROGRESS NOTES
I have reviewed and agree to the content of the note written by the PTA.   Electronically signed by Danish Wilder PT [id number]

## 2021-10-20 ENCOUNTER — HOSPITAL ENCOUNTER (OUTPATIENT)
Dept: PHYSICAL THERAPY | Age: 36
Setting detail: THERAPIES SERIES
Discharge: HOME | End: 2021-10-20
Payer: COMMERCIAL

## 2021-10-20 PROCEDURE — 97110 THERAPEUTIC EXERCISES: CPT

## 2021-10-20 NOTE — FLOWSHEET NOTE
Physical Therapy Daily Treatment Note    Date:  10/20/2021    Patient Name:  Livan Blevins    :  1985  MRN: [id number]     Restrictions/Precautions: Lace up brace at 10 weeks (10/13/21)      Medical/Treatment Diagnosis Information:   · Diagnosis: M76.72 L peroneal tendinitis, s/p peroneus brevis repair, debridement, tenolysis, peroneus longus debridement, tenolysis  · Treatment Diagnosis: S/P L peroneus brevis repair, debridement, tenolysis, peroneus longus debridement, tenolysis 3/6/73  Insurance/Certification information:  PT Insurance Information: [facility]  Physician Information:  Referring Practitioner: Kentrell Greer of care signed (Y/N): y    Visit# / total visits:  1/10 2nd POC (22 total for year)  Pain level: 1-2/10       Time In: 11:01 AM Time Out: 11:45 PM    Progress Note: []  Yes  [x]  No  Next due by: Visit #10, or 21        Subjective: Pt reports 1-2/10 pain on this date, but still feels pretty good. Patient reports she felt sore after last treatment, but not as sore as she did after the initial treatment in tennis shoes. Pt reports feeling like she is getting better on this date with very little pain/discomfort in her medial ankle. Patient reports she is not using a cane at home house, but does in the community. Patient reports compliance with HEP. Pt notes she has been wearing ASO brace with tennis shoes 2hr daily. Sx:   RTD: 10/28/21    Objective:  Performed ther-ex as documented on flowsheet to improve strength, ROM, and stability for ease with daily tasks and ambulation. Exercises progressed and gait initiated to facilitate independence with home and community activities. Patient performed session in tennis shoes and ASO brace with good tolerance. Pt notes slight increased discomfort in lateral aspect of L foot and ankle post treatment. Pt advised to elevate and ice at home for 15mins with towel between ice and skin.  Pt also advised to gradually increase wear of ASO brace throughout the day to increase tolerance. Observation: slight antalgic gait when ambulating in tennis shoe  Test measurements: L ankle DF: 9 degrees     L ankle PF: 59 degrees    Exercises: Performed in tennis shoes  Exercise/Equipment Resistance/Repetitions Other comments   AROM dflex, pflex, inversion 25x  No eversion    Gastro stretch knee extended 30\" x3 With strap   Achilles stretch, knee bent 30\", x3 With strap             stairwell 2x Reciprocal gait with ascent/sescent    Gait  2' In [place]        Toe Curls/ext 25x               Nu Step 5' L5    Counter Ex 15x2 (In Tennis Shoe) Hip 3-way, HS curls, marching   Sit to Stands 15x2    Step ups 15x 4\" fwd   [x] Provided verbal/tactile cueing for activities related to strengthening, flexibility, endurance, ROM. (33343)  [] Provided verbal/tactile cueing for activities related to improving balance, coordination, kinesthetic sense, posture, motor skill, proprioception. (09971)    Therapeutic Activities:     [] Therapeutic activities, direct (one-on-one) patient contact (use of dynamic activities to improve functional performance). (45808)    Gait:   [] Provided training and instruction to the patient for ambulation re-education. (76499)    Self-Care/ADL's  [] Self-care/home management training and compensatory training, meal preparation, safety procedures, and instructions in use of assistive technology devices/adaptive equipment, direct one-on-one contact. (60432)    Home Exercise Program:  AROM dflex, pflex, inversion.  Strap gastroc stretch & achilles stretch   [x] Reviewed/Progressed HEP activities related to strengthening, flexibility, endurance, ROM. (63474)  [] Reviewed/Progressed HEP activities related to improving balance, coordination, kinesthetic sense, posture, motor skill, proprioception.  (59466)    Manual Treatments:    [] Provided manual therapy to mobilize soft tissue/joints for the purpose of modulating pain, promoting relaxation,  increasing ROM, reducing/eliminating soft tissue swelling/inflammation/restriction, improving soft tissue extensibility. (60557)    Service Based Modalities:      Timed Code Treatment Minutes: 40'  There ex    Total Treatment Minutes: 40'    Treatment/Activity Tolerance:  [x] Patient tolerated treatment well [] Patient limited by fatique  [] Patient limited by pain  [] Patient limited by other medical complications  [] Other:     Prognosis: [x] Good [] Fair  [] Poor    Patient Requires Follow-up: [x] Yes  [] No      Goals:  Short term goals  Time Frame for Short term goals: 1 week  Short term goal 1: Start HEP MET  Short term goal 2: Review NWB plan MET    Long term goals  Time Frame for Long term goals : 12 weeks  Long term goal 1: Go thru weight bearing protocol progression to eventual FWB in a shoe (Pt currently transitioning into FWB in shoe)  Long term goal 2: Gait with no device for 30-40 min for return to community activity (Pt without AD only at home)  Long term goal 3: Up/ down 6-8\" steps for independent home activity (reciprocal with ascent/descent, required use of UE assist)  Long term goal 4: Able to stand for 30-40 min for return to home tasks (Pt able to tolerate 15-20 min standing with boot)    Plan:   [x] Continue per plan of care [] Alter current plan (see comments)  [] Plan of care initiated [] Hold pending MD visit [] Discharge    Plan for Next Session: Monitor tolerance to increased weight bearing and advance as able.      Electronically signed by:  Hailee Joyner PTA,

## 2021-10-22 ENCOUNTER — HOSPITAL ENCOUNTER (OUTPATIENT)
Dept: PHYSICAL THERAPY | Age: 36
Setting detail: THERAPIES SERIES
Discharge: HOME | End: 2021-10-22
Payer: COMMERCIAL

## 2021-10-22 PROCEDURE — 97110 THERAPEUTIC EXERCISES: CPT | Performed by: PHYSICAL THERAPIST

## 2021-10-22 NOTE — FLOWSHEET NOTE
Physical Therapy Daily Treatment Note    Date:  10/22/2021    Patient Name:  Vella Duverney    :  1985  MRN: [id number]     Restrictions/Precautions: Lace up brace at 10 weeks (10/13/21)      Medical/Treatment Diagnosis Information:   · Diagnosis: M76.72 L peroneal tendinitis, s/p peroneus brevis repair, debridement, tenolysis, peroneus longus debridement, tenolysis  · Treatment Diagnosis: S/P L peroneus brevis repair, debridement, tenolysis, peroneus longus debridement, tenolysis 3/7/31  Insurance/Certification information:  PT Insurance Information: [facility]  Physician Information:  Referring Practitioner: Alberta Arce of care signed (Y/N): y    Visit# / total visits:  2/10 2nd POC (23 total for year)  Pain level: 1-2/10       Time In: 10:13 AM Time Out: 10:59    Progress Note: []  Yes  [x]  No  Next due by: Visit #10, or 21        Subjective: \"On Wednesday I was really sore, but we had upped everything in therapy that date, so I figured I would be sore. I've been trying to go up and down my 3 steps about 3-4 times in a row, and sometimes I'll go outside for a walk on the pavers in the yard. \"    Sx:   RTD: 10/28/21    Objective:  Performed ther-ex as documented on flowsheet to improve strength, ROM, and stability for ease with daily tasks and ambulation. Exercises progressed and gait initiated to facilitate independence with home and community activities. Patient performed session in tennis shoes and ASO brace with good tolerance. Pt notes slight increased discomfort in lateral aspect of L foot and ankle post treatment. Pt advised to elevate and ice at home for 15mins with towel between ice and skin. Pt also advised to gradually increase wear of ASO brace throughout the day to increase tolerance.      Observation: mild antalgic gait when ambulating in tennis shoe  Test measurements: L ankle DF: 9 degrees     L ankle PF: 59 degrees    Exercises: Performed in tennis shoes with ASO brace also  Exercise/Equipment Resistance/Repetitions Other comments   AROM dflex, pflex, inversion 25x  No eversion    Gastro stretch knee extended 30\" x3 With strap   Achilles stretch, knee bent 30\", x3 With strap             stairwell 2x Reciprocal gait with ascent/sescent, no cane required this date   Gait  2' In [place], including up/down ramp x 2   Exaggerated heel/toe gait at // 2 laps each Fwd, retro   Toe Curls/ext 25x               Nu Step 5' L6    Counter Ex 15x2 (In Tennis Shoe/ASO) Hip 3-way, HS curls, marching   Sit to Stands 15x2    Step ups 15x 4\" Fwd, retro   [x] Provided verbal/tactile cueing for activities related to strengthening, flexibility, endurance, ROM. (98185)  [] Provided verbal/tactile cueing for activities related to improving balance, coordination, kinesthetic sense, posture, motor skill, proprioception. (40021)    Therapeutic Activities:     [] Therapeutic activities, direct (one-on-one) patient contact (use of dynamic activities to improve functional performance). (69671)    Gait:   [] Provided training and instruction to the patient for ambulation re-education. (19645)    Self-Care/ADL's  [] Self-care/home management training and compensatory training, meal preparation, safety procedures, and instructions in use of assistive technology devices/adaptive equipment, direct one-on-one contact. (22831)    Home Exercise Program:  AROM dflex, pflex, inversion.  Strap gastroc stretch & achilles stretch   [x] Reviewed/Progressed HEP activities related to strengthening, flexibility, endurance, ROM. (14208)  [] Reviewed/Progressed HEP activities related to improving balance, coordination, kinesthetic sense, posture, motor skill, proprioception.  (46012)    Manual Treatments:    [] Provided manual therapy to mobilize soft tissue/joints for the purpose of modulating pain, promoting relaxation,  increasing ROM, reducing/eliminating soft tissue swelling/inflammation/restriction, improving soft tissue extensibility. (69777)    Service Based Modalities:      Timed Code Treatment Minutes: 55'  There ex    Total Treatment Minutes: 55'    Treatment/Activity Tolerance:  [x] Patient tolerated treatment well [] Patient limited by fatique  [] Patient limited by pain  [] Patient limited by other medical complications  [] Other:     Prognosis: [x] Good [] Fair  [] Poor    Patient Requires Follow-up: [x] Yes  [] No      Goals:  Short term goals  Time Frame for Short term goals: 1 week  Short term goal 1: Start HEP MET  Short term goal 2: Review NWB plan MET    Long term goals  Time Frame for Long term goals : 12 weeks  Long term goal 1: Go thru weight bearing protocol progression to eventual FWB in a shoe (Pt currently transitioning into FWB in shoe)  Long term goal 2: Gait with no device for 30-40 min for return to community activity (Pt without AD only at home)  Long term goal 3: Up/ down 6-8\" steps for independent home activity (reciprocal with ascent/descent, required use of UE assist)  Long term goal 4: Able to stand for 30-40 min for return to home tasks (Pt able to tolerate 15-20 min standing with boot)    Plan:   [x] Continue per plan of care [] Alter current plan (see comments)  [] Plan of care initiated [] Hold pending MD visit [] Discharge    Plan for Next Session: Monitor tolerance to increased weight bearing and advance as able.      Electronically signed by:  Ciro Hooker, PT, DPT

## 2021-10-26 ENCOUNTER — HOSPITAL ENCOUNTER (OUTPATIENT)
Dept: PHYSICAL THERAPY | Age: 36
Setting detail: THERAPIES SERIES
Discharge: HOME | End: 2021-10-26
Payer: COMMERCIAL

## 2021-10-26 PROCEDURE — 97110 THERAPEUTIC EXERCISES: CPT

## 2021-10-26 NOTE — FLOWSHEET NOTE
stretch knee extended 30\" x3 standing    Achilles stretch, knee bent 30\", x3 standing             stairwell 2x Reciprocal gait with ascent/sescent, no cane required this date   Gait  2' In [place], including up/down ramp x 2   Exaggerated heel/toe gait at // 2 laps each Fwd, retro   sidesteps 2 laps     lunges 10x fwd   Slantboard 3x30\"    Nu Step 6' L6    Counter Ex 20x  Hip 3-way, HS curls, marching   Sit to Stands 10x2    Step ups 15x 6\" Fwd, retro   [x] Provided verbal/tactile cueing for activities related to strengthening, flexibility, endurance, ROM. (11395)  [] Provided verbal/tactile cueing for activities related to improving balance, coordination, kinesthetic sense, posture, motor skill, proprioception. (91673)    Therapeutic Activities:     [] Therapeutic activities, direct (one-on-one) patient contact (use of dynamic activities to improve functional performance). (29703)    Gait:   [] Provided training and instruction to the patient for ambulation re-education. (60691)    Self-Care/ADL's  [] Self-care/home management training and compensatory training, meal preparation, safety procedures, and instructions in use of assistive technology devices/adaptive equipment, direct one-on-one contact. (42029)    Home Exercise Program:  AROM dflex, pflex, inversion. Strap gastroc stretch & achilles stretch   [x] Reviewed/Progressed HEP activities related to strengthening, flexibility, endurance, ROM. (04830)  [] Reviewed/Progressed HEP activities related to improving balance, coordination, kinesthetic sense, posture, motor skill, proprioception.  (67385)    Manual Treatments:    [] Provided manual therapy to mobilize soft tissue/joints for the purpose of modulating pain, promoting relaxation,  increasing ROM, reducing/eliminating soft tissue swelling/inflammation/restriction, improving soft tissue extensibility.  (29027)    Service Based Modalities:      Timed Code Treatment Minutes: 45'  There ex    Total Treatment Minutes: 39'    Treatment/Activity Tolerance:  [x] Patient tolerated treatment well [] Patient limited by fatique  [] Patient limited by pain  [] Patient limited by other medical complications  [] Other:     Prognosis: [x] Good [] Fair  [] Poor    Patient Requires Follow-up: [x] Yes  [] No      Goals:  Short term goals  Time Frame for Short term goals: 1 week  Short term goal 1: Start HEP MET  Short term goal 2: Review NWB plan MET    Long term goals  Time Frame for Long term goals : 12 weeks  Long term goal 1: Go thru weight bearing protocol progression to eventual FWB in a shoe (Pt currently transitioning into FWB in shoe)  Long term goal 2: Gait with no device for 30-40 min for return to community activity (Pt without AD only at home)  Long term goal 3: Up/ down 6-8\" steps for independent home activity (reciprocal with ascent/descent, required use of UE assist)  Long term goal 4: Able to stand for 30-40 min for return to home tasks (Pt able to tolerate 15-20 min standing with boot)    Plan:   [x] Continue per plan of care [] Alter current plan (see comments)  [] Plan of care initiated [] Hold pending MD visit [] Discharge    Plan for Next Session: Monitor tolerance to increased weight bearing and advance as able.      Electronically signed by:  Rusty Chisholm PTA

## 2021-10-30 NOTE — PROGRESS NOTES
I have reviewed and agree to the content of the note written by the PTA.   Electronically signed by Pablo Diego PT [id number]

## 2021-11-04 ENCOUNTER — OFFICE VISIT (OUTPATIENT)
Dept: ORTHOPEDIC SURGERY | Age: 36
End: 2021-11-04

## 2021-11-04 VITALS — RESPIRATION RATE: 16 BRPM | WEIGHT: 293 LBS | BODY MASS INDEX: 44.41 KG/M2 | HEIGHT: 68 IN

## 2021-11-04 DIAGNOSIS — M76.72 PERONEAL TENDINITIS OF LEFT LOWER EXTREMITY: Primary | ICD-10-CM

## 2021-11-04 PROCEDURE — 99024 POSTOP FOLLOW-UP VISIT: CPT | Performed by: ORTHOPAEDIC SURGERY

## 2021-11-04 NOTE — PROGRESS NOTES
to clarify details surrounding this diagnosis), and tobacco use (reports she smokes 1/2 pack cigarettes per day, but reports that she quit on 7/12/2021 ). Precautions:               -WBAT, no motion restrictions             [x]? Physical Therapy/Home exercises         Immobilization:      []? Splint/Cast               []?  CAM boot                    [x]? Comfortable shoe    []? Other:                DVT ppx:   [x]? Early mobilization              []?  Medication as prescribed (Lovenox)                    [x]? No chemical ppx needed; mechanical only             -     Pain control:  Medication as prescribed (dosing and quantity) indicated for acute postoperative pain control             -     Special concerns:       [x]? Glucose control            [x]? Tobacco cessation           [x]? Avoid strenuous activity/pain provoking maneuvers and high-impact repetitive exercises     -        All questions were answered and the patient agrees with the above plan. The patient will return to clinic in 3 months PRN without xrays         No follow-ups on file. No orders of the defined types were placed in this encounter. No orders of the defined types were placed in this encounter. Kaela Valentino MD  Orthopedic Surgery        Please excuse any typos/errors, as this note was created with the assistance of voice recognition software. While intending to generate a document that actually reflects the content of the visit, the document can still have some errors including those of syntax and sound-a-like substitutions which may escape proof reading. In such instances, actual meaning can be extrapolated by context.

## 2021-11-05 ENCOUNTER — HOSPITAL ENCOUNTER (OUTPATIENT)
Dept: PHYSICAL THERAPY | Age: 36
Setting detail: THERAPIES SERIES
Discharge: HOME | End: 2021-11-05
Payer: COMMERCIAL

## 2021-11-05 PROCEDURE — 97110 THERAPEUTIC EXERCISES: CPT | Performed by: PHYSICAL THERAPIST

## 2021-11-05 NOTE — FLOWSHEET NOTE
Physical Therapy Daily Treatment Note    Date:  2021    Patient Name:  Olive De Paz    :  1985  MRN: [id number]     Restrictions/Precautions: Lace up brace at 10 weeks (10/13/21)      Medical/Treatment Diagnosis Information:   · Diagnosis: M76.72 L peroneal tendinitis, s/p peroneus brevis repair, debridement, tenolysis, peroneus longus debridement, tenolysis  · Treatment Diagnosis: S/P L peroneus brevis repair, debridement, tenolysis, peroneus longus debridement, tenolysis   Insurance/Certification information:  PT Insurance Information: [facility]  Physician Information:  Referring Practitioner: Trace Ware of care signed (Y/N): y    Visit# / total visits:  4/10 2nd POC (25 total for year)  Pain level: 1/10       Time In: 11:08 Time Out: 11:57    Progress Note: []  Yes  [x]  No  Next due by: Visit #10, or 21        Subjective: \"I had my most recent follow-up with the doctor and he said that I have no movement restrictions and can walk in my home with just the brace, no shoes needed. He said that in a couple weeks I can return to part-time work, but to ease my way back into it and not to jump right into fulltime. \"     Sx:   RTD: 10/28/21    Objective:  Performed ther-ex as documented on flowsheet to improve strength, ROM, and stability for ease with daily tasks and ambulation. Performed all exercises in tennis shoe and ASO brace. Patient given verbal cues to perform exercises properly. Patient no longer requires cueing to ambulate with proper gait mechanics. Improved gait mechanics with straight-line walking. Improved ease with ascent of stairs and descent still lacking approximately 5% of eccentric control. Observation: mild antalgic gait when ambulating in tennis shoe  Test measurements:  Ankle DF/PF: 4+/5    Exercises: Performed in tennis shoes with ASO brace  Exercise/Equipment Resistance/Repetitions Other comments   AROM 4 way 15x  Added eversion 21   Ankle Circles 15x Added 11/5/21   Ankle ABC's A-Z 1x Added 11/5/21   Gastro stretch knee extended 30\" x3 standing    Achilles stretch, knee bent 30\", x3 standing             stairwell 2x Reciprocal gait with ascent/sescent, no cane required this date   Gait  2' In [place], including up/down ramp x 2   Exaggerated heel/toe gait at // 2 laps each Fwd, retro   Squat/lunge to  cones 10 cones, 2x    3 way offset ball toss 3 x 30\"    sidesteps 4 laps     lunges 10x fwd   Slantboard 3x30\"    Nu Step 8' L6    Counter Ex 20x  Hip 3-way, HS curls, marching   Sit to Stands 10x2    Step ups 15x 8\" Fwd   [x] Provided verbal/tactile cueing for activities related to strengthening, flexibility, endurance, ROM. (96939)  [] Provided verbal/tactile cueing for activities related to improving balance, coordination, kinesthetic sense, posture, motor skill, proprioception. (63351)    Therapeutic Activities:     [] Therapeutic activities, direct (one-on-one) patient contact (use of dynamic activities to improve functional performance). (57409)    Gait:   [] Provided training and instruction to the patient for ambulation re-education. (61996)    Self-Care/ADL's  [] Self-care/home management training and compensatory training, meal preparation, safety procedures, and instructions in use of assistive technology devices/adaptive equipment, direct one-on-one contact. (56414)    Home Exercise Program:  AROM dflex, pflex, inversion.  Strap gastroc stretch & achilles stretch   [x] Reviewed/Progressed HEP activities related to strengthening, flexibility, endurance, ROM. (34407)  [] Reviewed/Progressed HEP activities related to improving balance, coordination, kinesthetic sense, posture, motor skill, proprioception.  (23178)    Manual Treatments:    [] Provided manual therapy to mobilize soft tissue/joints for the purpose of modulating pain, promoting relaxation,  increasing ROM, reducing/eliminating soft tissue swelling/inflammation/restriction, improving soft tissue extensibility. (49832)    Service Based Modalities:      Timed Code Treatment Minutes: 52'  There ex    Total Treatment Minutes: 52'    Treatment/Activity Tolerance:  [x] Patient tolerated treatment well [] Patient limited by fatique  [] Patient limited by pain  [] Patient limited by other medical complications  [] Other:     Prognosis: [x] Good [] Fair  [] Poor    Patient Requires Follow-up: [x] Yes  [] No      Goals:  Short term goals  Time Frame for Short term goals: 1 week  Short term goal 1: Start HEP MET  Short term goal 2: Review NWB plan MET    Long term goals  Time Frame for Long term goals : 12 weeks  Long term goal 1: Go thru weight bearing protocol progression to eventual FWB in a shoe (Pt currently transitioning into FWB in shoe)  Long term goal 2: Gait with no device for 30-40 min for return to community activity (Pt without AD only at home)  Long term goal 3: Up/ down 6-8\" steps for independent home activity (reciprocal with ascent/descent, required use of UE assist)  Long term goal 4: Able to stand for 30-40 min for return to home tasks (Pt able to tolerate 15-20 min standing with boot)    Plan:   [x] Continue per plan of care [] Alter current plan (see comments)  [] Plan of care initiated [] Hold pending MD visit [] Discharge    Plan for Next Session: Will continue 1x per week for 4-6 weeks to increase dynamic and functional strength and endurance.     Electronically signed by:  Tamanna Muñoz, PT, DPT

## 2021-12-01 NOTE — DISCHARGE SUMMARY
progression to eventual FWB in a shoe- met  Long term goal 2: Gait with no device for 30-40 min for return to community activity - met  Long term goal 3: Up/ down 6-8\" steps for independent home activity - met  Long term goal 4: Able to stand for 30-40 min for return to home tasks - met            Percentage of Goals Met: 100            Discharge Prognosis: [] Excellent [x] Good [] Fair  [] Poor     Goal Status:  [x] Achieved [] Partially Achieved  [] Not Achieved       Electronically signed by:  Harper Puri PT
Statement Selected

## 2022-03-22 ENCOUNTER — HOSPITAL ENCOUNTER (OUTPATIENT)
Age: 37
Setting detail: SPECIMEN
Discharge: HOME | End: 2022-03-22

## 2022-03-23 LAB
ALBUMIN SERPL-MCNC: 3.9 G/DL (ref 3.5–5.2)
ALBUMIN/GLOBULIN RATIO: 1.3 (ref 1–2.5)
ALP BLD-CCNC: 125 U/L (ref 35–104)
ALT SERPL-CCNC: 14 U/L (ref 5–33)
ANION GAP SERPL CALCULATED.3IONS-SCNC: 16 MMOL/L (ref 9–17)
AST SERPL-CCNC: 19 U/L
BILIRUB SERPL-MCNC: 0.19 MG/DL (ref 0.3–1.2)
BUN BLDV-MCNC: 13 MG/DL (ref 6–20)
CALCIUM SERPL-MCNC: 9.7 MG/DL (ref 8.6–10.4)
CHLORIDE BLD-SCNC: 99 MMOL/L (ref 98–107)
CHOLESTEROL/HDL RATIO: 3.1
CHOLESTEROL: 109 MG/DL
CO2: 20 MMOL/L (ref 20–31)
CREAT SERPL-MCNC: 0.78 MG/DL (ref 0.5–0.9)
GFR AFRICAN AMERICAN: >60 ML/MIN
GFR NON-AFRICAN AMERICAN: >60 ML/MIN
GFR SERPL CREATININE-BSD FRML MDRD: ABNORMAL ML/MIN/{1.73_M2}
GLUCOSE BLD-MCNC: 206 MG/DL (ref 70–99)
HCT VFR BLD CALC: 47.3 % (ref 36.3–47.1)
HDLC SERPL-MCNC: 35 MG/DL
HEMOGLOBIN: 15.2 G/DL (ref 11.9–15.1)
LDL CHOLESTEROL: 58 MG/DL (ref 0–130)
MCH RBC QN AUTO: 27.8 PG (ref 25.2–33.5)
MCHC RBC AUTO-ENTMCNC: 32.1 G/DL (ref 28.4–34.8)
MCV RBC AUTO: 86.6 FL (ref 82.6–102.9)
NRBC AUTOMATED: 0 PER 100 WBC
PDW BLD-RTO: 15.1 % (ref 11.8–14.4)
PLATELET # BLD: 342 K/UL (ref 138–453)
PMV BLD AUTO: 10 FL (ref 8.1–13.5)
POTASSIUM SERPL-SCNC: 4.5 MMOL/L (ref 3.7–5.3)
RBC # BLD: 5.46 M/UL (ref 3.95–5.11)
SODIUM BLD-SCNC: 135 MMOL/L (ref 135–144)
THYROXINE, FREE: 1.06 NG/DL (ref 0.93–1.7)
TOTAL PROTEIN: 7 G/DL (ref 6.4–8.3)
TRIGL SERPL-MCNC: 82 MG/DL
TSH SERPL DL<=0.05 MIU/L-ACNC: 6.82 MIU/L (ref 0.3–5)
VITAMIN D 25-HYDROXY: 61.6 NG/ML
WBC # BLD: 15.6 K/UL (ref 3.5–11.3)

## 2022-04-24 NOTE — FLOWSHEET NOTE
Physical Therapy Daily Treatment Note    Date:  10/13/2021    Patient Name:  Juan Palacios    :  1985  MRN: [id number]     Restrictions/Precautions: Lace up brace at 10 weeks (10/13/21)      Medical/Treatment Diagnosis Information:   · Diagnosis: M76.72 L peroneal tendinitis, s/p peroneus brevis repair, debridement, tenolysis, peroneus longus debridement, tenolysis  · Treatment Diagnosis: S/P L peroneus brevis repair, debridement, tenolysis, peroneus longus debridement, tenolysis 4/3/82  Insurance/Certification information:  PT Insurance Information: [facility]  Physician Information:  Referring Practitioner: Evgeny Gary of care signed (Y/N): y    Visit# / total visits:  9/10 (21 total for year)  Pain level: 0/10       Time In: 11:25 AM Time Out: 11:59 PM    Progress Note: []  Yes  [x]  No  Next due by: Visit #10, or 21        Subjective: Patient reports she is having slight pain in her medial ankle. Patient reports she does not use a cane at home house, but does use a cane in the community. Patient reports compliance with HEP. Sx:     Objective:  Performed ther-ex as documented on flowsheet to improve strength, ROM, and stability for ease with daily tasks and ambulation. Patient performed session in tennis shoe and ASO brace with good tolerance. Pt notes slight increased discomfort in lateral aspect of L foot and ankle when performing standing exercises in tennis shoes.     Observation: WBAT in CAM boot with straight cane  Test measurements:     Exercises:   Exercise/Equipment Resistance/Repetitions Other comments   AROM dflex, pflex, inversion 20x  No eversion    Gastro stretch knee extended 30\" x3 With strap   Achilles stretch, knee bent 30\", x3 With strap                            Toe Curls/ext 20x               Nu Step 5' L5    Counter Ex 10x (In Tennis Shoe) Hip 3-way, HS curls, marching   Sit to Stands 10x2    Step ups 10x 4\" fwd   [x] Provided verbal/tactile cueing for activities related to strengthening, flexibility, endurance, ROM. (92157)  [] Provided verbal/tactile cueing for activities related to improving balance, coordination, kinesthetic sense, posture, motor skill, proprioception. (39993)    Therapeutic Activities:     [] Therapeutic activities, direct (one-on-one) patient contact (use of dynamic activities to improve functional performance). (59036)    Gait:   [] Provided training and instruction to the patient for ambulation re-education. (94051)    Self-Care/ADL's  [] Self-care/home management training and compensatory training, meal preparation, safety procedures, and instructions in use of assistive technology devices/adaptive equipment, direct one-on-one contact. (62592)    Home Exercise Program:  AROM dflex, pflex, inversion. Strap gastroc stretch & achilles stretch   [x] Reviewed/Progressed HEP activities related to strengthening, flexibility, endurance, ROM. (61733)  [] Reviewed/Progressed HEP activities related to improving balance, coordination, kinesthetic sense, posture, motor skill, proprioception.  (65488)    Manual Treatments:    [] Provided manual therapy to mobilize soft tissue/joints for the purpose of modulating pain, promoting relaxation,  increasing ROM, reducing/eliminating soft tissue swelling/inflammation/restriction, improving soft tissue extensibility.  (29141)    Service Based Modalities:      Timed Code Treatment Minutes: 29'  There ex    Total Treatment Minutes: 29'    Treatment/Activity Tolerance:  [x] Patient tolerated treatment well [] Patient limited by fatique  [] Patient limited by pain  [] Patient limited by other medical complications  [] Other:     Prognosis: [x] Good [] Fair  [] Poor    Patient Requires Follow-up: [x] Yes  [] No      Goals:  Short term goals  Time Frame for Short term goals: 1 week  Short term goal 1: Start HEP MET  Short term goal 2: Review NWB plan MET    Long term goals  Time Frame for Long term goals : 12 weeks  Long term goal 1: Go thru weight bearing protocol progression to eventual FWB in a shoe (Ongoing, message sent to clarify weight bearing status)  Long term goal 2: Gait with no device for 30-40 min for return to community activity (Ongoing  Long term goal 3: Up/ down 6-8\" steps for independent home activity (Ongoing)  Long term goal 4: Able to stand for 30-40 min for return to home tasks ( Ongoing)    Plan:   [x] Continue per plan of care [] Alter current plan (see comments)  [] Plan of care initiated [] Hold pending MD visit [] Discharge    Plan for Next Session: Monitor tolerance to increased weight bearing and advance as able.      Electronically signed by:  Quincy Lazo PTA, 6

## 2023-06-23 ENCOUNTER — HOSPITAL ENCOUNTER (OUTPATIENT)
Age: 38
Setting detail: SPECIMEN
Discharge: HOME | End: 2023-06-23

## 2023-06-23 LAB
CANDIDA SPECIES, DNA PROBE: POSITIVE
GARDNERELLA VAGINALIS, DNA PROBE: NEGATIVE
SOURCE: ABNORMAL
TRICHOMONAS VAGINALIS DNA: NEGATIVE

## 2023-06-24 LAB
SOURCE: NORMAL
TRICHOMONAS VAGINALI, MOLECULAR: NEGATIVE

## 2023-06-26 LAB
C TRACH DNA SPEC QL PROBE+SIG AMP: NEGATIVE
N GONORRHOEA DNA SPEC QL PROBE+SIG AMP: NEGATIVE
SPECIMEN DESCRIPTION: NORMAL

## (undated) DEVICE — PADDING,UNDERCAST,COTTON, 4"X4YD STERILE: Brand: MEDLINE

## (undated) DEVICE — GLOVE SURG SZ 85 L12IN FNGR THK79MIL GRN LTX FREE

## (undated) DEVICE — APPLICATOR MEDICATED 26 CC SOLUTION HI LT ORNG CHLORAPREP

## (undated) DEVICE — DRAPE,U/ SHT,SPLIT,PLAS,STERIL: Brand: MEDLINE

## (undated) DEVICE — Device

## (undated) DEVICE — POSITIONER HD W8XH4XL8.5IN RASPBERRY FOAM SLT

## (undated) DEVICE — TOWEL,OR,DSP,ST,BLUE,DLX,XR,4/PK,20PK/CS: Brand: MEDLINE

## (undated) DEVICE — SUTURE ETHLN SZ 3-0 L18IN NONABSORBABLE BLK PS-2 L19MM 3/8 1669H

## (undated) DEVICE — BLADE, TONGUE, 6", STERILE: Brand: MEDLINE

## (undated) DEVICE — GAUZE,SPONGE,FLUFF,6"X6.75",STRL,5/TRAY: Brand: MEDLINE

## (undated) DEVICE — SMARTGOWN SURGICAL GOWN, 3XL, LONG: Brand: CONVERTORS

## (undated) DEVICE — ZIMMER® STERILE DISPOSABLE TOURNIQUET CUFF WITH PLC, DUAL PORT, SINGLE BLADDER, 42 IN. (107 CM)

## (undated) DEVICE — BANDAGE COMPR W6INXL12FT SMOOTH FOR LIMB EXSANG ESMARCH

## (undated) DEVICE — 3M™ STERI-DRAPE™ U-DRAPE 1015: Brand: STERI-DRAPE™

## (undated) DEVICE — 3M™ STERI-DRAPE™ INCISE DRAPE 1050 (60CM X 45CM): Brand: STERI-DRAPE™

## (undated) DEVICE — INTENDED FOR TISSUE SEPARATION, AND OTHER PROCEDURES THAT REQUIRE A SHARP SURGICAL BLADE TO PUNCTURE OR CUT.: Brand: BARD-PARKER ® CARBON RIB-BACK BLADES

## (undated) DEVICE — BNDG,ELSTC,MATRIX,STRL,6"X5YD,LF,HOOK&LP: Brand: MEDLINE

## (undated) DEVICE — GLOVE SURG SZ 8 CRM LTX FREE POLYISOPRENE POLYMER BEAD ANTI

## (undated) DEVICE — SKIN PREP TRAY W/CHG: Brand: MEDLINE INDUSTRIES, INC.

## (undated) DEVICE — DRAPE,REIN 53X77,STERILE: Brand: MEDLINE

## (undated) DEVICE — SPONGE LAP W18XL18IN WHT COT 4 PLY FLD STRUNG RADPQ DISP ST

## (undated) DEVICE — PAD,ABDOMINAL,5"X9",ST,LF,25/BX: Brand: MEDLINE INDUSTRIES, INC.

## (undated) DEVICE — GARMENT,MEDLINE,DVT,INT,CALF,MED, GEN2: Brand: MEDLINE

## (undated) DEVICE — SUTURE VCRL SZ 2-0 L27IN ABSRB UD L26MM CT-2 1/2 CIR J269H

## (undated) DEVICE — SUTURE ETHBND EXCEL SZ 0 L30IN NONABSORBABLE GRN L26MM CT-2 X412H

## (undated) DEVICE — STRIP WND CLSR W1 4XL4IN POLYAMIDE MACROPOROUS NONWOVEN H2O

## (undated) DEVICE — BLANKET WRM W29.9XL79.1IN UP BODY FORC AIR MISTRAL-AIR

## (undated) DEVICE — NDL CNTR 40CT FM MAG: Brand: MEDLINE INDUSTRIES, INC.